# Patient Record
Sex: MALE | Race: WHITE | Employment: OTHER | URBAN - METROPOLITAN AREA
[De-identification: names, ages, dates, MRNs, and addresses within clinical notes are randomized per-mention and may not be internally consistent; named-entity substitution may affect disease eponyms.]

---

## 2017-01-20 RX ORDER — MELOXICAM 15 MG/1
TABLET ORAL
Qty: 30 TABLET | Refills: 3 | Status: SHIPPED | OUTPATIENT
Start: 2017-01-20 | End: 2017-05-15 | Stop reason: SDUPTHER

## 2017-05-15 RX ORDER — MELOXICAM 15 MG/1
TABLET ORAL
Qty: 30 TABLET | Refills: 6 | Status: SHIPPED | OUTPATIENT
Start: 2017-05-15 | End: 2017-12-24 | Stop reason: SDUPTHER

## 2017-06-16 RX ORDER — ROSUVASTATIN CALCIUM 40 MG/1
TABLET, COATED ORAL
Qty: 90 TABLET | Refills: 0 | Status: SHIPPED | OUTPATIENT
Start: 2017-06-16 | End: 2017-09-06 | Stop reason: SDUPTHER

## 2017-07-06 ENCOUNTER — TELEPHONE (OUTPATIENT)
Dept: CARDIOLOGY CLINIC | Age: 67
End: 2017-07-06

## 2017-07-06 ENCOUNTER — OFFICE VISIT (OUTPATIENT)
Dept: INTERNAL MEDICINE | Age: 67
End: 2017-07-06

## 2017-07-06 VITALS
SYSTOLIC BLOOD PRESSURE: 132 MMHG | HEART RATE: 60 BPM | BODY MASS INDEX: 35.12 KG/M2 | HEIGHT: 73 IN | DIASTOLIC BLOOD PRESSURE: 89 MMHG | RESPIRATION RATE: 12 BRPM | WEIGHT: 265 LBS

## 2017-07-06 DIAGNOSIS — Z01.818 PRE-OP EXAM: ICD-10-CM

## 2017-07-06 DIAGNOSIS — Z01.818 PRE-OP EXAM: Primary | ICD-10-CM

## 2017-07-06 DIAGNOSIS — Z12.5 SPECIAL SCREENING FOR MALIGNANT NEOPLASM OF PROSTATE: ICD-10-CM

## 2017-07-06 LAB
ANION GAP SERPL CALCULATED.3IONS-SCNC: 16 MMOL/L (ref 3–16)
BASOPHILS ABSOLUTE: 0 K/UL (ref 0–0.2)
BASOPHILS RELATIVE PERCENT: 0.5 %
BUN BLDV-MCNC: 24 MG/DL (ref 7–20)
CALCIUM SERPL-MCNC: 9.9 MG/DL (ref 8.3–10.6)
CHLORIDE BLD-SCNC: 101 MMOL/L (ref 99–110)
CO2: 27 MMOL/L (ref 21–32)
CREAT SERPL-MCNC: 0.8 MG/DL (ref 0.8–1.3)
EOSINOPHILS ABSOLUTE: 0.4 K/UL (ref 0–0.6)
EOSINOPHILS RELATIVE PERCENT: 4.4 %
GFR AFRICAN AMERICAN: >60
GFR NON-AFRICAN AMERICAN: >60
GLUCOSE BLD-MCNC: 89 MG/DL (ref 70–99)
HCT VFR BLD CALC: 51.2 % (ref 40.5–52.5)
HEMOGLOBIN: 16.8 G/DL (ref 13.5–17.5)
LYMPHOCYTES ABSOLUTE: 1.5 K/UL (ref 1–5.1)
LYMPHOCYTES RELATIVE PERCENT: 18.9 %
MCH RBC QN AUTO: 31.9 PG (ref 26–34)
MCHC RBC AUTO-ENTMCNC: 32.7 G/DL (ref 31–36)
MCV RBC AUTO: 97.4 FL (ref 80–100)
MONOCYTES ABSOLUTE: 0.6 K/UL (ref 0–1.3)
MONOCYTES RELATIVE PERCENT: 7.7 %
NEUTROPHILS ABSOLUTE: 5.5 K/UL (ref 1.7–7.7)
NEUTROPHILS RELATIVE PERCENT: 68.5 %
PDW BLD-RTO: 14.7 % (ref 12.4–15.4)
PLATELET # BLD: 214 K/UL (ref 135–450)
PMV BLD AUTO: 9.8 FL (ref 5–10.5)
POTASSIUM SERPL-SCNC: 4.7 MMOL/L (ref 3.5–5.1)
RBC # BLD: 5.25 M/UL (ref 4.2–5.9)
SODIUM BLD-SCNC: 144 MMOL/L (ref 136–145)
WBC # BLD: 8.1 K/UL (ref 4–11)

## 2017-07-06 PROCEDURE — G8427 DOCREV CUR MEDS BY ELIG CLIN: HCPCS | Performed by: INTERNAL MEDICINE

## 2017-07-06 PROCEDURE — 1036F TOBACCO NON-USER: CPT | Performed by: INTERNAL MEDICINE

## 2017-07-06 PROCEDURE — G8599 NO ASA/ANTIPLAT THER USE RNG: HCPCS | Performed by: INTERNAL MEDICINE

## 2017-07-06 PROCEDURE — 93000 ELECTROCARDIOGRAM COMPLETE: CPT | Performed by: INTERNAL MEDICINE

## 2017-07-06 PROCEDURE — 4040F PNEUMOC VAC/ADMIN/RCVD: CPT | Performed by: INTERNAL MEDICINE

## 2017-07-06 PROCEDURE — G8417 CALC BMI ABV UP PARAM F/U: HCPCS | Performed by: INTERNAL MEDICINE

## 2017-07-06 PROCEDURE — 99243 OFF/OP CNSLTJ NEW/EST LOW 30: CPT | Performed by: INTERNAL MEDICINE

## 2017-07-06 PROCEDURE — 3017F COLORECTAL CA SCREEN DOC REV: CPT | Performed by: INTERNAL MEDICINE

## 2017-08-14 ENCOUNTER — OFFICE VISIT (OUTPATIENT)
Dept: CARDIOLOGY CLINIC | Age: 67
End: 2017-08-14

## 2017-08-14 VITALS
BODY MASS INDEX: 34.83 KG/M2 | DIASTOLIC BLOOD PRESSURE: 70 MMHG | HEART RATE: 68 BPM | SYSTOLIC BLOOD PRESSURE: 124 MMHG | WEIGHT: 264 LBS

## 2017-08-14 DIAGNOSIS — I10 ESSENTIAL HYPERTENSION: ICD-10-CM

## 2017-08-14 DIAGNOSIS — E78.00 HYPERCHOLESTEROLEMIA: ICD-10-CM

## 2017-08-14 DIAGNOSIS — I25.119 CORONARY ARTERY DISEASE INVOLVING NATIVE CORONARY ARTERY OF NATIVE HEART WITH ANGINA PECTORIS (HCC): ICD-10-CM

## 2017-08-14 DIAGNOSIS — I25.119 CORONARY ARTERY DISEASE INVOLVING NATIVE CORONARY ARTERY OF NATIVE HEART WITH ANGINA PECTORIS (HCC): Primary | ICD-10-CM

## 2017-08-14 LAB
ALBUMIN SERPL-MCNC: 4.1 G/DL (ref 3.4–5)
ALP BLD-CCNC: 93 U/L (ref 40–129)
ALT SERPL-CCNC: 14 U/L (ref 10–40)
AST SERPL-CCNC: 16 U/L (ref 15–37)
BILIRUB SERPL-MCNC: 0.9 MG/DL (ref 0–1)
BILIRUBIN DIRECT: <0.2 MG/DL (ref 0–0.3)
BILIRUBIN, INDIRECT: ABNORMAL MG/DL (ref 0–1)
CHOLESTEROL, TOTAL: 142 MG/DL (ref 0–199)
HDLC SERPL-MCNC: 48 MG/DL (ref 40–60)
LDL CHOLESTEROL CALCULATED: 73 MG/DL
TOTAL CK: 58 U/L (ref 39–308)
TOTAL PROTEIN: 6.3 G/DL (ref 6.4–8.2)
TRIGL SERPL-MCNC: 103 MG/DL (ref 0–150)
VLDLC SERPL CALC-MCNC: 21 MG/DL

## 2017-08-14 PROCEDURE — 1123F ACP DISCUSS/DSCN MKR DOCD: CPT | Performed by: INTERNAL MEDICINE

## 2017-08-14 PROCEDURE — 3017F COLORECTAL CA SCREEN DOC REV: CPT | Performed by: INTERNAL MEDICINE

## 2017-08-14 PROCEDURE — 99214 OFFICE O/P EST MOD 30 MIN: CPT | Performed by: INTERNAL MEDICINE

## 2017-08-14 PROCEDURE — G8427 DOCREV CUR MEDS BY ELIG CLIN: HCPCS | Performed by: INTERNAL MEDICINE

## 2017-08-14 PROCEDURE — 1036F TOBACCO NON-USER: CPT | Performed by: INTERNAL MEDICINE

## 2017-08-14 PROCEDURE — G8599 NO ASA/ANTIPLAT THER USE RNG: HCPCS | Performed by: INTERNAL MEDICINE

## 2017-08-14 PROCEDURE — 4040F PNEUMOC VAC/ADMIN/RCVD: CPT | Performed by: INTERNAL MEDICINE

## 2017-08-14 PROCEDURE — G8417 CALC BMI ABV UP PARAM F/U: HCPCS | Performed by: INTERNAL MEDICINE

## 2017-08-14 RX ORDER — OXYCODONE HYDROCHLORIDE AND ACETAMINOPHEN 5; 325 MG/1; MG/1
TABLET ORAL
Refills: 0 | COMMUNITY
Start: 2017-07-12 | End: 2018-06-26 | Stop reason: ALTCHOICE

## 2017-09-07 RX ORDER — ROSUVASTATIN CALCIUM 40 MG/1
TABLET, COATED ORAL
Qty: 90 TABLET | Refills: 0 | Status: SHIPPED | OUTPATIENT
Start: 2017-09-07 | End: 2018-03-20 | Stop reason: SDUPTHER

## 2017-12-05 RX ORDER — CARVEDILOL 6.25 MG/1
TABLET ORAL
Qty: 180 TABLET | Refills: 3 | Status: SHIPPED | OUTPATIENT
Start: 2017-12-05 | End: 2018-09-18 | Stop reason: SDUPTHER

## 2017-12-13 RX ORDER — MELOXICAM 15 MG/1
TABLET ORAL
Qty: 30 TABLET | Refills: 0 | OUTPATIENT
Start: 2017-12-13

## 2017-12-22 RX ORDER — RAMIPRIL 10 MG/1
CAPSULE ORAL
Qty: 90 CAPSULE | Refills: 3 | Status: SHIPPED | OUTPATIENT
Start: 2017-12-22 | End: 2019-01-18 | Stop reason: SDUPTHER

## 2017-12-22 NOTE — TELEPHONE ENCOUNTER
Requested Prescriptions     Pending Prescriptions Disp Refills    ramipril (ALTACE) 10 MG capsule [Pharmacy Med Name: RAMIPRIL 10MG CAPSULES] 90 capsule 3     Sig: TAKE ONE CAPSULE BY MOUTH EVERY DAY   Last Fill         Last seen        Next appointment      8/14/17 8/14/17 8/13/18        Last labs     Ordering Physician     8/14/17 Nolan Parkinson

## 2017-12-26 RX ORDER — MELOXICAM 15 MG/1
TABLET ORAL
Qty: 30 TABLET | Refills: 5 | Status: ON HOLD | OUTPATIENT
Start: 2017-12-26 | End: 2018-06-27 | Stop reason: HOSPADM

## 2018-03-20 ENCOUNTER — TELEPHONE (OUTPATIENT)
Dept: CARDIOLOGY CLINIC | Age: 68
End: 2018-03-20

## 2018-03-20 RX ORDER — ROSUVASTATIN CALCIUM 40 MG/1
TABLET, COATED ORAL
Qty: 90 TABLET | Refills: 1 | Status: SHIPPED | OUTPATIENT
Start: 2018-03-20 | End: 2018-09-13 | Stop reason: SDUPTHER

## 2018-06-26 PROBLEM — R07.9 CHEST PAIN: Status: ACTIVE | Noted: 2018-06-26

## 2018-06-26 PROBLEM — R07.89 ATYPICAL CHEST PAIN: Status: ACTIVE | Noted: 2018-06-26

## 2018-09-06 ENCOUNTER — OFFICE VISIT (OUTPATIENT)
Dept: CARDIOLOGY CLINIC | Age: 68
End: 2018-09-06

## 2018-09-06 VITALS
SYSTOLIC BLOOD PRESSURE: 130 MMHG | HEART RATE: 54 BPM | DIASTOLIC BLOOD PRESSURE: 80 MMHG | BODY MASS INDEX: 35.73 KG/M2 | WEIGHT: 269.6 LBS | HEIGHT: 73 IN | OXYGEN SATURATION: 99 %

## 2018-09-06 DIAGNOSIS — I50.22 CHRONIC SYSTOLIC HEART FAILURE (HCC): Primary | ICD-10-CM

## 2018-09-06 DIAGNOSIS — I10 ESSENTIAL HYPERTENSION: ICD-10-CM

## 2018-09-06 DIAGNOSIS — I25.5 ISCHEMIC CARDIOMYOPATHY: ICD-10-CM

## 2018-09-06 DIAGNOSIS — E78.2 MIXED HYPERLIPIDEMIA: ICD-10-CM

## 2018-09-06 PROCEDURE — G8598 ASA/ANTIPLAT THER USED: HCPCS | Performed by: INTERNAL MEDICINE

## 2018-09-06 PROCEDURE — G8427 DOCREV CUR MEDS BY ELIG CLIN: HCPCS | Performed by: INTERNAL MEDICINE

## 2018-09-06 PROCEDURE — 1036F TOBACCO NON-USER: CPT | Performed by: INTERNAL MEDICINE

## 2018-09-06 PROCEDURE — 4040F PNEUMOC VAC/ADMIN/RCVD: CPT | Performed by: INTERNAL MEDICINE

## 2018-09-06 PROCEDURE — G8417 CALC BMI ABV UP PARAM F/U: HCPCS | Performed by: INTERNAL MEDICINE

## 2018-09-06 PROCEDURE — 99214 OFFICE O/P EST MOD 30 MIN: CPT | Performed by: INTERNAL MEDICINE

## 2018-09-06 PROCEDURE — 1101F PT FALLS ASSESS-DOCD LE1/YR: CPT | Performed by: INTERNAL MEDICINE

## 2018-09-06 PROCEDURE — 1123F ACP DISCUSS/DSCN MKR DOCD: CPT | Performed by: INTERNAL MEDICINE

## 2018-09-06 PROCEDURE — 3017F COLORECTAL CA SCREEN DOC REV: CPT | Performed by: INTERNAL MEDICINE

## 2018-09-06 NOTE — PROGRESS NOTES
[address]      Cardiology Consult    Unger Day  1950    September 6, 2018    Primary care physician: Chepe Emmanuel MD  Reason for Referral: Ischemic cardiomyopathy    CC: \"No chest pains\"     HPI:  The patient is 77 y.o. male with a past medical history significant for ischemic cardiomyopathy, CAD s/p PCI in 2000, HTN, MIC, and HLD who presents today for management of cardiomyopathy. He presented to the hospital 6/2018 with complaints of atypical chest pain and ruled out for ACS. He previously followed with Dr. Bebe Rob. He had an acute proximal LAD MI in 2000 with a resulting ischemic cardiomyopathy. In general, he felt he was doing quite well and had no recurrent cardiac issues. Today, he denies any recurrence of chest pains. He reports compliance with his medications and is tolerating. Patient denies dyspnea at rest, worsening AVERY, PND, orthopnea, palpitations, lightheadedness, weight changes, changes in LE edema, and syncope. In general, he feels most limited by orthopaedic issues with right sided hip and knee pains. He denies having to stop any activity secondary to dyspnea. Past Medical History:   Diagnosis Date    Acute MI ([facility] [address] 2000    coronary angiogram revealed a normal left main, normal circumflex, the LAD complete occlusion proximally with a mid 65% stenosis that was reduced by PTCA/stent to 0%, 20% distal right coronary with an LVEF of 35%.      Basal cell carcinoma     chin    CAD (coronary artery disease)     Cardiomyopathy ([facility] [address]     Hypercholesterolemia     Hypertension     Obstructive sleep apnea 2014    Dr. Saunders [facility]     Past Surgical History:   Procedure Laterality Date    CARDIAC CATHETERIZATION      PTCA/stent in the mid LAD reducing a 65% stenosis to 0%    CORONARY ANGIOPLASTY      stent    KNEE ARTHROSCOPY  *July 12, 2017    Dr. Norma Barlow - right torn meniscus    SKIN CANCER EXCISION      chin     Family History   Problem Relation Age of Onset  Cancer Mother [de-identified]         - breast cancer    Heart Disease Father 79         - ASHD     Social History   Substance Use Topics    Smoking status: Never Smoker    Smokeless tobacco: Never Used    Alcohol use Yes      Comment: 2 kristal per night       No Known Allergies  Current Outpatient Prescriptions   Medication Sig Dispense Refill    pantoprazole (PROTONIX) 40 MG tablet Take 1 tablet by mouth every morning (before breakfast) 30 tablet 3    spironolactone (ALDACTONE) 25 MG tablet Take 1 tablet by mouth daily 30 tablet 3    aspirin 81 MG tablet Take 162 mg by mouth nightly      rosuvastatin (CRESTOR) 40 MG tablet TAKE 1 TABLET BY MOUTH EVERY DAY 90 tablet 1    ramipril (ALTACE) 10 MG capsule TAKE ONE CAPSULE BY MOUTH EVERY DAY 90 capsule 3    carvedilol (COREG) 6.25 MG tablet TAKE 1 TABLET BY MOUTH TWICE DAILY WITH MEALS 180 tablet 3     No current facility-administered medications for this visit. Review of Systems:  · Constitutional: no unanticipated weight loss. There's been no change in energy level, sleep pattern, or activity level. No fevers, chills. · Eyes: No visual changes or diplopia. No scleral icterus. · ENT: No Headaches, hearing loss or vertigo. No mouth sores or sore throat. · Cardiovascular: as reviewed in HPI  · Respiratory: No cough or wheezing, no sputum production. No hematemesis. · Gastrointestinal: No abdominal pain, appetite loss, blood in stools. No change in bowel or bladder habits. · Genitourinary: No dysuria, trouble voiding, or hematuria. · Musculoskeletal:  No gait disturbance, no joint complaints. · Integumentary: No rash or pruritis. · Neurological: No headache, diplopia, change in muscle strength, numbness or tingling. · Psychiatric: No anxiety or depression. · Endocrine: No temperature intolerance. No excessive thirst, fluid intake, or urination. No tremor.   · Hematologic/Lymphatic: No abnormal bruising or bleeding, blood clots or swollen lymph nodes.  · Allergic/Immunologic: No nasal congestion or hives. Physical Exam:   /80 (Site: Left Arm, Position: Sitting, Cuff Size: Medium Adult)   Pulse 54   Ht 6' 1\" (1.854 m)   Wt 269 lb 9.6 oz (122.3 kg)   SpO2 99%   BMI 35.57 kg/m²   Wt Readings from Last 3 Encounters:   18 269 lb 9.6 oz (122.3 kg)   18 266 lb (120.7 kg)   18 267 lb 10.2 oz (121.4 kg)     Constitutional: He is oriented to person, place, and time. He appears well-developed and well-nourished. In no acute distress. Head: Normocephalic and atraumatic. Pupils equal and round. Neck: Neck supple. No JVP or carotid bruit appreciated. No mass and no thyromegaly present. No lymphadenopathy present. Cardiovascular: Normal rate. Normal heart sounds. Exam reveals no gallop and no friction rub. No murmur heard. Pulmonary/Chest: Effort normal and breath sounds normal. No respiratory distress. He has no wheezes, rhonchi or rales. Abdominal: Soft, non-tender. Bowel sounds are normal. He exhibits no organomegaly, mass or bruit. Extremities: No edema, cyanosis, or clubbing. Pulses are 2+ radial/carotid bilaterally. Neurological: No gross cranial nerve deficit. Coordination normal.   Skin: Skin is warm and dry. There is no rash or diaphoresis. Psychiatric: He has a normal mood and affect. His speech is normal and behavior is normal.     Lab Review:   FLP:    Lab Results   Component Value Date    TRIG 85 2018    HDL 51 2018    LDLCALC 57 2018    LABVLDL 17 2018     BUN/Creatinine:    Lab Results   Component Value Date    BUN 18 2018    CREATININE 0.8 2018     EK18 NSR. Left axis. LBBB    Echo: 18  Suboptimal image quality.   Definity contrast administered.   Ejection fraction appears significantly decreased, with apical,akinesis &   severe anterior hypokinesis. Estimated LVEF is 59-78%   Diastolic filling parameters suggest grade I diastolic dysfunction.   Mitral valve leaflets appear mildly thickened.   Trivial mitral regurgitation is present.   Trivial aortic regurgitation is present.  IVC size is dilated (>2.1 cm) and collapses < 50% with respiration   consistent with elevated RA pressure (15 mmHg).    Cath: 2000 Proximal LAD occlusion.      Lexiscan: 6/26/18   Abnormal study. There is a large sized, severe intensity, fixed defect of    the mid anterior, mid anteroseptal, mid inferoseptal, all apical segments    and the apical cap which is consistent with scar/prior infarction.    No evidence of significant reversible ischemia.    Severely dilated left ventricle with moderately reduced ejection fraction    calculated at 37 %.    Overall findings represent a high risk study based on the sized of the fixed    defect consistent with prior LAD infarct.         Assessment and Plan   1) Ischemic cardiomyopathy/chronic systolic heart failure. EF 30-35%. Appears euvolemic/compensated. NYHA II. Continue with medical management and risk factor modification including aspirin, ACE-I, statin, and B-blocker. Referred to EP for ICD or BiV-ICD consideration. Discussed option of Entresto but he is most comfortable continuing current management.    2) Essential hypertension. Controlled. Goal BP <130/80. Continue medical therapy.    3) Hyperlipidemia. Continue high intensity statin.       Follow up in 6 months and with Dr. Shannan Rowley with scheduled appointment 9/18/18. Thank you very much for allowing me to participate in the care of your patient. Please do not hesitate to contact me if you have any questions. Sincerely,  Nuvia Melendez. Jimi Nascimento, [address] [place] [address]  Ph: [phone]  Fax: [phone]      This note was scribed in the presence of Dr Jimi Nascimento MD by Preston Denson, ALAN. Physician Attestation: The scribes documentation has been prepared under my direction and personally reviewed by me it its entirety. I confirm that the note above accurately reflects all work, treatment, procedures, and medical decision making performed by me.

## 2018-09-14 RX ORDER — ROSUVASTATIN CALCIUM 40 MG/1
TABLET, COATED ORAL
Qty: 90 TABLET | Refills: 3 | Status: SHIPPED | OUTPATIENT
Start: 2018-09-14 | End: 2018-11-09 | Stop reason: SDUPTHER

## 2018-09-14 RX ORDER — ROSUVASTATIN CALCIUM 40 MG/1
TABLET, COATED ORAL
Qty: 90 TABLET | Refills: 0 | Status: SHIPPED | OUTPATIENT
Start: 2018-09-14 | End: 2019-10-09 | Stop reason: SDUPTHER

## 2018-09-17 NOTE — PROGRESS NOTES
[facility]   Cardiac Consultation    Referring Provider:  Isma Ross MD     Chief Complaint   Patient presents with    Cardiomyopathy     Ref. Dr. Fab Andres possible Bi-V ICD. CHF,MIC,CAD. EF 30-35%. Patient denies sx's. HPI:  Ernesto Victoria is a 77 y.o. male with a past medical history significant for ischemic cardiomyopathy, CAD s/p PCI in 2000, HTN, MIC compliant with CPAP, and HLD. The patient is typically followed by Dr. Fab Andres (previously Dr. Renae Conn) for his cardiac needs but arrives today to consider B- V ICD implant for primary prevention of SCD. He remains as active as possible-- his limitations are secondary to knee, hip and back pain. Up until 2 years ago he was walking 4-5 miles daily (completed in 1 hour) without cardiac symptoms. 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 He has been on the appropriate medication regimen for many years it has not been changed or titrated up in a long time. He denies lightheadedness, dizziness, chest pain, palpitations, orthopnea, edema, presyncope or syncope. Treatment of MIC he indicates improved his LV fx significantly. Past Medical History:   has a past medical history of Acute MI ([facility] [address] Basal cell carcinoma; CAD (coronary artery disease); Cardiomyopathy ([facility] [address] Hypercholesterolemia; Hypertension; and Obstructive sleep apnea. Surgical History:   has a past surgical history that includes Cardiac catheterization; Skin cancer excision; Knee arthroscopy (*July 12, 2017); and Coronary angioplasty. Social History:   reports that he has never smoked. He has never used smokeless tobacco. He reports that he drinks alcohol. He reports that he does not use drugs. Family History:  family history includes Cancer (age of onset: [de-identified]) in his mother; Heart Disease (age of onset: 79) in his father.      Home Medications:  Outpatient Encounter Prescriptions as of 9/18/2018   Medication Sig Dispense Refill    carvedilol (COREG) 12.5 MG medication titration followed by a repeat ECHO prior to deciding on the Bi-V ICD implant since he falls on the cusp of guidelines      Plan:  -Medication titration and repeat ECHO to Dr. Justo Harrison  -Pending the outcome will place Bi-V ICD   -Follow up pending ef reassessment    This note was scribed in the presence of Dr. Lexi De La Garza MD by Yamilka Hollins RN. The scribes documentation has been prepared under my direction and personally reviewed by me in its entirety. I confirm that the note above accurately reflects all work, treatment, procedures, and medical decision making performed by me. Tye Smith.  Liban GRAHAM

## 2018-09-18 ENCOUNTER — TELEPHONE (OUTPATIENT)
Dept: CARDIOLOGY CLINIC | Age: 68
End: 2018-09-18

## 2018-09-18 ENCOUNTER — OFFICE VISIT (OUTPATIENT)
Dept: CARDIOLOGY CLINIC | Age: 68
End: 2018-09-18

## 2018-09-18 VITALS
OXYGEN SATURATION: 98 % | WEIGHT: 271 LBS | BODY MASS INDEX: 35.92 KG/M2 | DIASTOLIC BLOOD PRESSURE: 70 MMHG | SYSTOLIC BLOOD PRESSURE: 116 MMHG | HEART RATE: 60 BPM | HEIGHT: 73 IN

## 2018-09-18 DIAGNOSIS — Z71.89 ENCOUNTER FOR DISCUSSION REGARDING IMPLANTABLE CARDIOVERTER-DEFIBRILLATOR (ICD): ICD-10-CM

## 2018-09-18 DIAGNOSIS — I25.5 ISCHEMIC CARDIOMYOPATHY: Primary | ICD-10-CM

## 2018-09-18 PROCEDURE — 93000 ELECTROCARDIOGRAM COMPLETE: CPT | Performed by: INTERNAL MEDICINE

## 2018-09-18 PROCEDURE — 1036F TOBACCO NON-USER: CPT | Performed by: INTERNAL MEDICINE

## 2018-09-18 PROCEDURE — 4040F PNEUMOC VAC/ADMIN/RCVD: CPT | Performed by: INTERNAL MEDICINE

## 2018-09-18 PROCEDURE — 1101F PT FALLS ASSESS-DOCD LE1/YR: CPT | Performed by: INTERNAL MEDICINE

## 2018-09-18 PROCEDURE — 99204 OFFICE O/P NEW MOD 45 MIN: CPT | Performed by: INTERNAL MEDICINE

## 2018-09-18 PROCEDURE — 3017F COLORECTAL CA SCREEN DOC REV: CPT | Performed by: INTERNAL MEDICINE

## 2018-09-18 PROCEDURE — G8427 DOCREV CUR MEDS BY ELIG CLIN: HCPCS | Performed by: INTERNAL MEDICINE

## 2018-09-18 PROCEDURE — G8417 CALC BMI ABV UP PARAM F/U: HCPCS | Performed by: INTERNAL MEDICINE

## 2018-09-18 PROCEDURE — 1123F ACP DISCUSS/DSCN MKR DOCD: CPT | Performed by: INTERNAL MEDICINE

## 2018-09-18 PROCEDURE — G8598 ASA/ANTIPLAT THER USED: HCPCS | Performed by: INTERNAL MEDICINE

## 2018-09-18 RX ORDER — CARVEDILOL 12.5 MG/1
12.5 TABLET ORAL 2 TIMES DAILY
Qty: 60 TABLET | Refills: 6 | Status: SHIPPED | OUTPATIENT
Start: 2018-09-18 | End: 2018-11-09 | Stop reason: SDUPTHER

## 2018-09-18 NOTE — TELEPHONE ENCOUNTER
The patient was seen in office today with Dr. Eloy Watson. He was referred by Dr. Concepción Cam for possible Bi-V ICD. He is borderline criteria to meet guidelines. Pt and Dr. Eloy Watson came to the agreement that they would like to try optimizing his medications further before pursuing implant. In office, Dr. Eloy Watson increased his beta blocker and would like Dr. Concepción Cam to continue to follow/ manage. Once they have been optimized to the fullest and a repeat ECHO has been obtained, please refer him back to Dr. Eloy Watson. Thanks!

## 2018-11-08 ENCOUNTER — OFFICE VISIT (OUTPATIENT)
Dept: INTERNAL MEDICINE CLINIC | Age: 68
End: 2018-11-08
Payer: COMMERCIAL

## 2018-11-08 VITALS
RESPIRATION RATE: 12 BRPM | DIASTOLIC BLOOD PRESSURE: 66 MMHG | BODY MASS INDEX: 36.18 KG/M2 | HEART RATE: 70 BPM | HEIGHT: 73 IN | SYSTOLIC BLOOD PRESSURE: 118 MMHG | WEIGHT: 273 LBS

## 2018-11-08 DIAGNOSIS — Z13.29 SCREENING FOR THYROID DISORDER: ICD-10-CM

## 2018-11-08 DIAGNOSIS — Z01.818 PRE-OP EXAM: Primary | ICD-10-CM

## 2018-11-08 DIAGNOSIS — M16.9 OSTEOARTHRITIS OF HIP, UNSPECIFIED LATERALITY, UNSPECIFIED OSTEOARTHRITIS TYPE: ICD-10-CM

## 2018-11-08 DIAGNOSIS — Z12.5 SPECIAL SCREENING FOR MALIGNANT NEOPLASM OF PROSTATE: ICD-10-CM

## 2018-11-08 DIAGNOSIS — Z12.11 SCREENING FOR COLON CANCER: ICD-10-CM

## 2018-11-08 LAB
PROSTATE SPECIFIC ANTIGEN: 1.47 NG/ML (ref 0–4)
TSH SERPL DL<=0.05 MIU/L-ACNC: 0.91 UIU/ML (ref 0.27–4.2)

## 2018-11-08 PROCEDURE — G8427 DOCREV CUR MEDS BY ELIG CLIN: HCPCS | Performed by: INTERNAL MEDICINE

## 2018-11-08 PROCEDURE — G8417 CALC BMI ABV UP PARAM F/U: HCPCS | Performed by: INTERNAL MEDICINE

## 2018-11-08 PROCEDURE — 1101F PT FALLS ASSESS-DOCD LE1/YR: CPT | Performed by: INTERNAL MEDICINE

## 2018-11-08 PROCEDURE — 3017F COLORECTAL CA SCREEN DOC REV: CPT | Performed by: INTERNAL MEDICINE

## 2018-11-08 PROCEDURE — 99243 OFF/OP CNSLTJ NEW/EST LOW 30: CPT | Performed by: INTERNAL MEDICINE

## 2018-11-08 PROCEDURE — G8482 FLU IMMUNIZE ORDER/ADMIN: HCPCS | Performed by: INTERNAL MEDICINE

## 2018-11-08 ASSESSMENT — PATIENT HEALTH QUESTIONNAIRE - PHQ9
SUM OF ALL RESPONSES TO PHQ QUESTIONS 1-9: 0
SUM OF ALL RESPONSES TO PHQ9 QUESTIONS 1 & 2: 0
1. LITTLE INTEREST OR PLEASURE IN DOING THINGS: 0
2. FEELING DOWN, DEPRESSED OR HOPELESS: 0
SUM OF ALL RESPONSES TO PHQ QUESTIONS 1-9: 0

## 2018-11-08 NOTE — PROGRESS NOTES
normal, no murmur, click, rub or gallop  Abdomen: soft, non-tender; bowel sounds normal; no masses,  no organomegaly  Extremities: extremities normal, atraumatic, no cyanosis or edema  Neurological: Gait normal. Reflexes normal and symmetric.  Sensation grossly normal  Pulse: radial=4/4, femoral=4/4, popliteal=4/4, dorsalis pedis=4/4,   Skin: normal coloration and turgor, no rashes, no suspicious skin lesions noted  Psych: normal    Lab Review:   Admission on 06/26/2018, Discharged on 06/27/2018   Component Date Value    Ventricular Rate 06/26/2018 69     Atrial Rate 06/26/2018 69     P-R Interval 06/26/2018 178     QRS Duration 06/26/2018 140     Q-T Interval 06/26/2018 402     QTc Calculation (Bazett) 06/26/2018 430     P Axis 06/26/2018 23     R Axis 06/26/2018 -55     T Axis 06/26/2018 102     Diagnosis 06/26/2018                      Value:Normal sinus rhythm  Left axis deviation  Left bundle branch block  Abnormal ECG  No previous ECGs available  Confirmed by REBECA LOGAN MD ([id number]) on 6/26/2018 9:29:49 AM      WBC 06/26/2018 8.2     RBC 06/26/2018 4.67     Hemoglobin 06/26/2018 14.6     Hematocrit 06/26/2018 44.8     MCV 06/26/2018 95.8     MCH 06/26/2018 31.3     MCHC 06/26/2018 32.7     RDW 06/26/2018 13.1     Platelets 51/75/0870 175     MPV 06/26/2018 9.4     Sodium 06/26/2018 142     Potassium 06/26/2018 4.0     Chloride 06/26/2018 103     CO2 06/26/2018 29     Anion Gap 06/26/2018 10     Glucose 06/26/2018 149*    BUN 06/26/2018 16     CREATININE 06/26/2018 0.6*    GFR Non- 06/26/2018 >60     GFR  06/26/2018 >60     Calcium 06/26/2018 9.5     Troponin 06/26/2018 <0.01     Troponin 06/26/2018 <0.01     Pro-BNP 06/26/2018 540*    Hemoglobin A1C 06/26/2018 5.9     eAG 06/26/2018 122.6     Cholesterol, Total 06/27/2018 125     Triglycerides 06/27/2018 85     HDL 06/27/2018 51     LDL Calculated 06/27/2018 57     VLDL Cholesterol

## 2018-11-09 ENCOUNTER — OFFICE VISIT (OUTPATIENT)
Dept: CARDIOLOGY CLINIC | Age: 68
End: 2018-11-09
Payer: COMMERCIAL

## 2018-11-09 VITALS
WEIGHT: 272 LBS | DIASTOLIC BLOOD PRESSURE: 70 MMHG | SYSTOLIC BLOOD PRESSURE: 136 MMHG | HEART RATE: 58 BPM | HEIGHT: 73 IN | BODY MASS INDEX: 36.05 KG/M2 | OXYGEN SATURATION: 96 %

## 2018-11-09 DIAGNOSIS — Z01.818 PRE-OPERATIVE CLEARANCE: ICD-10-CM

## 2018-11-09 DIAGNOSIS — I25.5 ISCHEMIC CARDIOMYOPATHY: Primary | ICD-10-CM

## 2018-11-09 DIAGNOSIS — I10 ESSENTIAL HYPERTENSION: ICD-10-CM

## 2018-11-09 DIAGNOSIS — I50.22 CHRONIC SYSTOLIC CONGESTIVE HEART FAILURE (HCC): ICD-10-CM

## 2018-11-09 DIAGNOSIS — E78.2 MIXED HYPERLIPIDEMIA: ICD-10-CM

## 2018-11-09 PROCEDURE — G8417 CALC BMI ABV UP PARAM F/U: HCPCS | Performed by: INTERNAL MEDICINE

## 2018-11-09 PROCEDURE — 3017F COLORECTAL CA SCREEN DOC REV: CPT | Performed by: INTERNAL MEDICINE

## 2018-11-09 PROCEDURE — 1101F PT FALLS ASSESS-DOCD LE1/YR: CPT | Performed by: INTERNAL MEDICINE

## 2018-11-09 PROCEDURE — G8482 FLU IMMUNIZE ORDER/ADMIN: HCPCS | Performed by: INTERNAL MEDICINE

## 2018-11-09 PROCEDURE — 1036F TOBACCO NON-USER: CPT | Performed by: INTERNAL MEDICINE

## 2018-11-09 PROCEDURE — 4040F PNEUMOC VAC/ADMIN/RCVD: CPT | Performed by: INTERNAL MEDICINE

## 2018-11-09 PROCEDURE — 1123F ACP DISCUSS/DSCN MKR DOCD: CPT | Performed by: INTERNAL MEDICINE

## 2018-11-09 PROCEDURE — G8598 ASA/ANTIPLAT THER USED: HCPCS | Performed by: INTERNAL MEDICINE

## 2018-11-09 PROCEDURE — 99214 OFFICE O/P EST MOD 30 MIN: CPT | Performed by: INTERNAL MEDICINE

## 2018-11-09 PROCEDURE — G8427 DOCREV CUR MEDS BY ELIG CLIN: HCPCS | Performed by: INTERNAL MEDICINE

## 2018-11-09 RX ORDER — PANTOPRAZOLE SODIUM 40 MG/1
40 TABLET, DELAYED RELEASE ORAL
Qty: 30 TABLET | Refills: 3 | Status: SHIPPED | OUTPATIENT
Start: 2018-11-09 | End: 2019-10-29

## 2018-11-09 RX ORDER — CARVEDILOL 25 MG/1
25 TABLET ORAL 2 TIMES DAILY
Qty: 60 TABLET | Refills: 5 | Status: SHIPPED | OUTPATIENT
Start: 2018-11-09 | End: 2019-06-20 | Stop reason: SDUPTHER

## 2018-11-09 RX ORDER — SPIRONOLACTONE 25 MG/1
25 TABLET ORAL DAILY
Qty: 30 TABLET | Refills: 3 | Status: SHIPPED | OUTPATIENT
Start: 2018-11-09 | End: 2019-02-22 | Stop reason: SDUPTHER

## 2018-11-09 NOTE — PATIENT INSTRUCTIONS
you how much sodium you get in one serving. Check the serving size. If you eat more than one serving, you are getting more sodium. · Be aware that sodium can come in forms other than salt, including monosodium glutamate (MSG), sodium citrate, and sodium bicarbonate (baking soda). MSG is often added to Asian food. You can sometimes ask for food without MSG or salt. · Slowly reducing salt will help you adjust to the taste. Take the salt shaker off the table. · Flavor your food with garlic, lemon juice, onion, vinegar, herbs, and spices instead of salt. Do not use soy sauce, steak sauce, onion salt, garlic salt, mustard, or ketchup on your food, unless it is labeled \"low-sodium\" or \"low-salt. \"  · Make your own salad dressings, sauces, and ketchup without adding salt. · Use fresh or frozen ingredients, instead of canned ones, whenever you can. Choose low-sodium canned goods. · Eat less processed food and food from restaurants, including fast food. Exercise as directed  Moderate, regular exercise is very good for your heart. It improves your blood flow and helps control your weight. But too much exercise can stress your heart and cause a heart failure flare-up. · Check with your doctor before you start an exercise program.  · Walking is an easy way to get exercise. Start out slowly. Gradually increase the length and pace of your walk. Swimming, riding a bike, and using a treadmill are also good forms of exercise. · When you exercise, watch for signs that your heart is working too hard. You are pushing yourself too hard if you cannot talk while you are exercising. If you become short of breath or dizzy or have chest pain, stop, sit down, and rest.  · Do not exercise when you do not feel well. Take medicines correctly  · Take your medicines exactly as prescribed. Call your doctor if you think you are having a problem with your medicine. · Make a list of all the medicines you take.  Include those prescribed to you

## 2019-01-18 RX ORDER — RAMIPRIL 10 MG/1
CAPSULE ORAL
Qty: 90 CAPSULE | Refills: 0 | Status: SHIPPED | OUTPATIENT
Start: 2019-01-18 | End: 2019-04-15 | Stop reason: SDUPTHER

## 2019-02-12 ENCOUNTER — OFFICE VISIT (OUTPATIENT)
Dept: CARDIOLOGY CLINIC | Age: 69
End: 2019-02-12
Payer: COMMERCIAL

## 2019-02-12 VITALS
WEIGHT: 267 LBS | BODY MASS INDEX: 35.39 KG/M2 | HEART RATE: 53 BPM | SYSTOLIC BLOOD PRESSURE: 112 MMHG | OXYGEN SATURATION: 99 % | HEIGHT: 73 IN | DIASTOLIC BLOOD PRESSURE: 62 MMHG

## 2019-02-12 DIAGNOSIS — I10 ESSENTIAL HYPERTENSION: ICD-10-CM

## 2019-02-12 DIAGNOSIS — I50.22 CHRONIC SYSTOLIC CONGESTIVE HEART FAILURE (HCC): ICD-10-CM

## 2019-02-12 DIAGNOSIS — E78.2 MIXED HYPERLIPIDEMIA: ICD-10-CM

## 2019-02-12 DIAGNOSIS — I25.5 ISCHEMIC CARDIOMYOPATHY: Primary | ICD-10-CM

## 2019-02-12 PROCEDURE — 1036F TOBACCO NON-USER: CPT | Performed by: INTERNAL MEDICINE

## 2019-02-12 PROCEDURE — 4040F PNEUMOC VAC/ADMIN/RCVD: CPT | Performed by: INTERNAL MEDICINE

## 2019-02-12 PROCEDURE — G8417 CALC BMI ABV UP PARAM F/U: HCPCS | Performed by: INTERNAL MEDICINE

## 2019-02-12 PROCEDURE — G8482 FLU IMMUNIZE ORDER/ADMIN: HCPCS | Performed by: INTERNAL MEDICINE

## 2019-02-12 PROCEDURE — 99214 OFFICE O/P EST MOD 30 MIN: CPT | Performed by: INTERNAL MEDICINE

## 2019-02-12 PROCEDURE — 93000 ELECTROCARDIOGRAM COMPLETE: CPT | Performed by: INTERNAL MEDICINE

## 2019-02-12 PROCEDURE — 1123F ACP DISCUSS/DSCN MKR DOCD: CPT | Performed by: INTERNAL MEDICINE

## 2019-02-12 PROCEDURE — G8598 ASA/ANTIPLAT THER USED: HCPCS | Performed by: INTERNAL MEDICINE

## 2019-02-12 PROCEDURE — 1101F PT FALLS ASSESS-DOCD LE1/YR: CPT | Performed by: INTERNAL MEDICINE

## 2019-02-12 PROCEDURE — 3017F COLORECTAL CA SCREEN DOC REV: CPT | Performed by: INTERNAL MEDICINE

## 2019-02-12 PROCEDURE — G8427 DOCREV CUR MEDS BY ELIG CLIN: HCPCS | Performed by: INTERNAL MEDICINE

## 2019-02-22 RX ORDER — SPIRONOLACTONE 25 MG/1
25 TABLET ORAL DAILY
Qty: 30 TABLET | Refills: 5 | Status: SHIPPED | OUTPATIENT
Start: 2019-02-22 | End: 2019-07-24 | Stop reason: SDUPTHER

## 2019-04-15 RX ORDER — RAMIPRIL 10 MG/1
CAPSULE ORAL
Qty: 90 CAPSULE | Refills: 0 | Status: SHIPPED | OUTPATIENT
Start: 2019-04-15 | End: 2019-07-07 | Stop reason: SDUPTHER

## 2019-06-20 DIAGNOSIS — I25.5 ISCHEMIC CARDIOMYOPATHY: ICD-10-CM

## 2019-06-20 RX ORDER — CARVEDILOL 25 MG/1
TABLET ORAL
Qty: 60 TABLET | Refills: 2 | Status: SHIPPED | OUTPATIENT
Start: 2019-06-20 | End: 2019-09-09 | Stop reason: SDUPTHER

## 2019-07-08 RX ORDER — RAMIPRIL 10 MG/1
CAPSULE ORAL
Qty: 90 CAPSULE | Refills: 1 | Status: SHIPPED | OUTPATIENT
Start: 2019-07-08 | End: 2020-01-03

## 2019-07-26 RX ORDER — SPIRONOLACTONE 25 MG/1
25 TABLET ORAL DAILY
Qty: 30 TABLET | Refills: 0 | Status: SHIPPED | OUTPATIENT
Start: 2019-07-26 | End: 2019-10-09 | Stop reason: SDUPTHER

## 2019-09-09 DIAGNOSIS — I25.5 ISCHEMIC CARDIOMYOPATHY: ICD-10-CM

## 2019-09-10 RX ORDER — CARVEDILOL 25 MG/1
TABLET ORAL
Qty: 180 TABLET | Refills: 1 | Status: SHIPPED | OUTPATIENT
Start: 2019-09-10 | End: 2020-03-04

## 2019-10-09 ENCOUNTER — TELEPHONE (OUTPATIENT)
Dept: CARDIOLOGY CLINIC | Age: 69
End: 2019-10-09

## 2019-10-09 DIAGNOSIS — E78.2 MIXED HYPERLIPIDEMIA: Primary | ICD-10-CM

## 2019-10-09 DIAGNOSIS — I10 ESSENTIAL HYPERTENSION: ICD-10-CM

## 2019-10-09 RX ORDER — ROSUVASTATIN CALCIUM 40 MG/1
TABLET, COATED ORAL
Qty: 30 TABLET | Refills: 3 | Status: SHIPPED | OUTPATIENT
Start: 2019-10-09 | End: 2020-02-06

## 2019-10-09 RX ORDER — SPIRONOLACTONE 25 MG/1
25 TABLET ORAL DAILY
Qty: 30 TABLET | Refills: 3 | Status: SHIPPED | OUTPATIENT
Start: 2019-10-09 | End: 2020-02-06

## 2019-10-25 DIAGNOSIS — I50.22 CHRONIC SYSTOLIC CONGESTIVE HEART FAILURE (HCC): ICD-10-CM

## 2019-10-25 DIAGNOSIS — E78.2 MIXED HYPERLIPIDEMIA: ICD-10-CM

## 2019-10-25 DIAGNOSIS — I25.5 ISCHEMIC CARDIOMYOPATHY: ICD-10-CM

## 2019-10-25 DIAGNOSIS — I10 ESSENTIAL HYPERTENSION: ICD-10-CM

## 2019-10-25 LAB
ANION GAP SERPL CALCULATED.3IONS-SCNC: 12 MMOL/L (ref 3–16)
BUN BLDV-MCNC: 19 MG/DL (ref 7–20)
CALCIUM SERPL-MCNC: 9.6 MG/DL (ref 8.3–10.6)
CHLORIDE BLD-SCNC: 101 MMOL/L (ref 99–110)
CO2: 26 MMOL/L (ref 21–32)
CREAT SERPL-MCNC: 0.7 MG/DL (ref 0.8–1.3)
GFR AFRICAN AMERICAN: >60
GFR NON-AFRICAN AMERICAN: >60
GLUCOSE BLD-MCNC: 100 MG/DL (ref 70–99)
POTASSIUM SERPL-SCNC: 4.6 MMOL/L (ref 3.5–5.1)
SODIUM BLD-SCNC: 139 MMOL/L (ref 136–145)

## 2019-10-29 ENCOUNTER — OFFICE VISIT (OUTPATIENT)
Dept: CARDIOLOGY CLINIC | Age: 69
End: 2019-10-29
Payer: COMMERCIAL

## 2019-10-29 VITALS
OXYGEN SATURATION: 99 % | DIASTOLIC BLOOD PRESSURE: 66 MMHG | HEIGHT: 73 IN | HEART RATE: 47 BPM | BODY MASS INDEX: 35.25 KG/M2 | WEIGHT: 266 LBS | SYSTOLIC BLOOD PRESSURE: 104 MMHG

## 2019-10-29 DIAGNOSIS — E78.2 MIXED HYPERLIPIDEMIA: ICD-10-CM

## 2019-10-29 DIAGNOSIS — I50.22 CHRONIC SYSTOLIC HEART FAILURE (HCC): ICD-10-CM

## 2019-10-29 DIAGNOSIS — G47.33 OSA (OBSTRUCTIVE SLEEP APNEA): ICD-10-CM

## 2019-10-29 DIAGNOSIS — I25.5 ISCHEMIC CARDIOMYOPATHY: Primary | ICD-10-CM

## 2019-10-29 DIAGNOSIS — I10 ESSENTIAL HYPERTENSION: ICD-10-CM

## 2019-10-29 PROCEDURE — 4040F PNEUMOC VAC/ADMIN/RCVD: CPT | Performed by: INTERNAL MEDICINE

## 2019-10-29 PROCEDURE — 1123F ACP DISCUSS/DSCN MKR DOCD: CPT | Performed by: INTERNAL MEDICINE

## 2019-10-29 PROCEDURE — G8417 CALC BMI ABV UP PARAM F/U: HCPCS | Performed by: INTERNAL MEDICINE

## 2019-10-29 PROCEDURE — G8484 FLU IMMUNIZE NO ADMIN: HCPCS | Performed by: INTERNAL MEDICINE

## 2019-10-29 PROCEDURE — G8427 DOCREV CUR MEDS BY ELIG CLIN: HCPCS | Performed by: INTERNAL MEDICINE

## 2019-10-29 PROCEDURE — G8598 ASA/ANTIPLAT THER USED: HCPCS | Performed by: INTERNAL MEDICINE

## 2019-10-29 PROCEDURE — 1036F TOBACCO NON-USER: CPT | Performed by: INTERNAL MEDICINE

## 2019-10-29 PROCEDURE — 99214 OFFICE O/P EST MOD 30 MIN: CPT | Performed by: INTERNAL MEDICINE

## 2019-10-29 PROCEDURE — 3017F COLORECTAL CA SCREEN DOC REV: CPT | Performed by: INTERNAL MEDICINE

## 2019-10-29 PROCEDURE — 93000 ELECTROCARDIOGRAM COMPLETE: CPT | Performed by: INTERNAL MEDICINE

## 2019-11-19 ENCOUNTER — HOSPITAL ENCOUNTER (OUTPATIENT)
Dept: NON INVASIVE DIAGNOSTICS | Age: 69
Discharge: HOME | End: 2019-11-19
Payer: COMMERCIAL

## 2019-11-19 DIAGNOSIS — I25.5 ISCHEMIC CARDIOMYOPATHY: ICD-10-CM

## 2019-11-19 DIAGNOSIS — I50.22 CHRONIC SYSTOLIC HEART FAILURE (HCC): ICD-10-CM

## 2019-11-19 LAB
LV EF: 38 %
LVEF MODALITY: NORMAL

## 2019-11-19 PROCEDURE — 6360000004 HC RX CONTRAST MEDICATION: Performed by: INTERNAL MEDICINE

## 2019-11-19 PROCEDURE — C8929 TTE W OR WO FOL WCON,DOPPLER: HCPCS

## 2019-11-19 RX ADMIN — PERFLUTREN 1.65 MG: 6.52 INJECTION, SUSPENSION INTRAVENOUS at 11:40

## 2019-11-25 DIAGNOSIS — I77.819 DILATATION OF AORTA (HCC): ICD-10-CM

## 2019-11-25 DIAGNOSIS — I77.819 AORTIC DILATATION (HCC): ICD-10-CM

## 2019-11-25 DIAGNOSIS — I50.22 CHRONIC SYSTOLIC CONGESTIVE HEART FAILURE (HCC): Primary | ICD-10-CM

## 2019-12-19 ENCOUNTER — HOSPITAL ENCOUNTER (OUTPATIENT)
Dept: NON INVASIVE DIAGNOSTICS | Age: 69
Discharge: HOME | End: 2019-12-19
Payer: COMMERCIAL

## 2019-12-19 ENCOUNTER — HOSPITAL ENCOUNTER (OUTPATIENT)
Dept: CT IMAGING | Age: 69
Discharge: HOME | End: 2019-12-19
Payer: COMMERCIAL

## 2019-12-19 DIAGNOSIS — I50.22 CHRONIC SYSTOLIC CONGESTIVE HEART FAILURE (HCC): ICD-10-CM

## 2019-12-19 DIAGNOSIS — I77.819 AORTIC DILATATION (HCC): ICD-10-CM

## 2019-12-19 LAB
LV EF: 36 %
LVEF MODALITY: NORMAL

## 2019-12-19 PROCEDURE — 3430000000 HC RX DIAGNOSTIC RADIOPHARMACEUTICAL: Performed by: INTERNAL MEDICINE

## 2019-12-19 PROCEDURE — 71250 CT THORAX DX C-: CPT

## 2019-12-19 PROCEDURE — 78472 GATED HEART PLANAR SINGLE: CPT

## 2019-12-19 RX ORDER — TECHNETIUM TC99M PYROPHOSPHATE 12 MG/10ML
25 INJECTION INTRAVENOUS ONCE
Status: COMPLETED | OUTPATIENT
Start: 2019-12-19 | End: 2019-12-19

## 2019-12-19 RX ADMIN — TECHNETIUM TC99M PYROPHOSPHATE 25 MILLICURIE: 12 INJECTION INTRAVENOUS at 08:11

## 2019-12-23 ENCOUNTER — TELEPHONE (OUTPATIENT)
Dept: CARDIOLOGY CLINIC | Age: 69
End: 2019-12-23

## 2019-12-23 DIAGNOSIS — I77.819 DILATATION OF AORTA (HCC): Primary | ICD-10-CM

## 2020-01-03 RX ORDER — RAMIPRIL 10 MG/1
CAPSULE ORAL
Qty: 90 CAPSULE | Refills: 1 | Status: SHIPPED | OUTPATIENT
Start: 2020-01-03 | End: 2020-06-29

## 2020-02-06 RX ORDER — SPIRONOLACTONE 25 MG/1
25 TABLET ORAL DAILY
Qty: 30 TABLET | Refills: 3 | Status: SHIPPED | OUTPATIENT
Start: 2020-02-06 | End: 2020-05-29

## 2020-02-06 RX ORDER — ROSUVASTATIN CALCIUM 40 MG/1
TABLET, COATED ORAL
Qty: 30 TABLET | Refills: 3 | Status: SHIPPED | OUTPATIENT
Start: 2020-02-06 | End: 2020-05-29

## 2020-03-04 RX ORDER — CARVEDILOL 25 MG/1
TABLET ORAL
Qty: 180 TABLET | Refills: 3 | Status: SHIPPED | OUTPATIENT
Start: 2020-03-04 | End: 2021-02-23

## 2020-05-29 RX ORDER — SPIRONOLACTONE 25 MG/1
25 TABLET ORAL DAILY
Qty: 30 TABLET | Refills: 0 | Status: SHIPPED | OUTPATIENT
Start: 2020-05-29 | End: 2020-06-29

## 2020-05-29 RX ORDER — ROSUVASTATIN CALCIUM 40 MG/1
TABLET, COATED ORAL
Qty: 30 TABLET | Refills: 0 | Status: SHIPPED | OUTPATIENT
Start: 2020-05-29 | End: 2020-06-29

## 2020-06-29 RX ORDER — RAMIPRIL 10 MG/1
CAPSULE ORAL
Qty: 90 CAPSULE | Refills: 1 | Status: SHIPPED | OUTPATIENT
Start: 2020-06-29 | End: 2020-12-29 | Stop reason: SDUPTHER

## 2020-06-29 RX ORDER — SPIRONOLACTONE 25 MG/1
25 TABLET ORAL DAILY
Qty: 30 TABLET | Refills: 2 | Status: SHIPPED | OUTPATIENT
Start: 2020-06-29 | End: 2020-09-28 | Stop reason: SDUPTHER

## 2020-06-29 RX ORDER — ROSUVASTATIN CALCIUM 40 MG/1
TABLET, COATED ORAL
Qty: 30 TABLET | Refills: 0 | Status: SHIPPED | OUTPATIENT
Start: 2020-06-29 | End: 2020-07-28

## 2020-07-28 RX ORDER — ROSUVASTATIN CALCIUM 40 MG/1
TABLET, COATED ORAL
Qty: 30 TABLET | Refills: 5 | Status: SHIPPED | OUTPATIENT
Start: 2020-07-28 | End: 2021-01-25

## 2020-09-28 RX ORDER — SPIRONOLACTONE 25 MG/1
25 TABLET ORAL DAILY
Qty: 30 TABLET | Refills: 5 | Status: SHIPPED | OUTPATIENT
Start: 2020-09-28 | End: 2021-03-25

## 2020-12-29 ENCOUNTER — TELEPHONE (OUTPATIENT)
Dept: CARDIOLOGY CLINIC | Age: 70
End: 2020-12-29

## 2020-12-29 RX ORDER — RAMIPRIL 10 MG/1
CAPSULE ORAL
Qty: 30 CAPSULE | Refills: 0 | Status: SHIPPED | OUTPATIENT
Start: 2020-12-29 | End: 2021-01-25 | Stop reason: SDUPTHER

## 2020-12-29 NOTE — TELEPHONE ENCOUNTER
LAST OV:  10/29/2019 - follow up in [address]:  NONE  LAST BMP:  10/25/2019     will call the pt to schedule overdue follow up

## 2020-12-29 NOTE — TELEPHONE ENCOUNTER
Please call the is pt to schedule overdue 6mos follow up  We should have seen him in April 2020    thanks

## 2020-12-29 NOTE — TELEPHONE ENCOUNTER
Pt called in to schedule an appointment. Stated he does not want to come in office until after he gets the coronavirus vaccine. Malcom Bernheim said he is on the list and will have the second shot by February hopefully.  He is scheduled to see Dr. Lexi Mata on 3/2 @ 8am

## 2021-01-25 RX ORDER — RAMIPRIL 10 MG/1
CAPSULE ORAL
Qty: 30 CAPSULE | Refills: 0 | Status: SHIPPED | OUTPATIENT
Start: 2021-01-25 | End: 2021-02-23

## 2021-01-25 RX ORDER — ROSUVASTATIN CALCIUM 40 MG/1
TABLET, COATED ORAL
Qty: 30 TABLET | Refills: 5 | Status: SHIPPED | OUTPATIENT
Start: 2021-01-25 | End: 2021-07-23

## 2021-02-23 DIAGNOSIS — I25.5 ISCHEMIC CARDIOMYOPATHY: ICD-10-CM

## 2021-02-23 RX ORDER — RAMIPRIL 10 MG/1
CAPSULE ORAL
Qty: 90 CAPSULE | Refills: 3 | Status: SHIPPED | OUTPATIENT
Start: 2021-02-23 | End: 2022-02-16

## 2021-02-23 RX ORDER — CARVEDILOL 25 MG/1
TABLET ORAL
Qty: 180 TABLET | Refills: 3 | Status: SHIPPED | OUTPATIENT
Start: 2021-02-23 | End: 2022-02-16

## 2021-03-25 RX ORDER — SPIRONOLACTONE 25 MG/1
25 TABLET ORAL DAILY
Qty: 30 TABLET | Refills: 5 | Status: SHIPPED | OUTPATIENT
Start: 2021-03-25 | End: 2021-09-21

## 2021-08-09 NOTE — PROGRESS NOTES
[facility]      Cardiology Consult    Rob Braga  1950    August 10, 2021    Reason for Referral: Ischemic cardiomyopathy    CC: \"I'm feeling fine. \"     HPI:  The patient is 66 y.o. male with a past medical history significant for ischemic cardiomyopathy, CAD s/p PCI in 2000, HTN, MIC, and HLD who presents today for management of cardiomyopathy. He presented to the hospital 6/2018 with complaints of atypical chest pain and ruled out for ACS. He previously followed with Dr. Willow Almaguer. He had an acute proximal LAD MI in 2000 with a resulting ischemic cardiomyopathy. In general, he felt he was doing quite well and had no recurrent cardiac issues. He was evaluated by Dr. Cooper Leslie for ICD consideration and at that time deferred ICD placement. His repeat echo 11/2019 showed his EF remained reduced at 35%. He completed a Muga scan that confirmed the EF at 36%. Today, he states overall he is doing well. He denies any new cardiac complaints and states he continues to remain active without any exertional symptoms. He denies any chest pains or worsening shortness of breath. He reports compliance with his medications and tolerating. He denies any abnormal bleeding or bruising. Patient denies exertional chest pain/pressure, dyspnea at rest, worsening AVERY, PND, orthopnea, palpitations, lightheadedness, weight changes, changes in LE edema, and syncope. He reports compliance with his CPAP. Past Medical History:   Diagnosis Date    Acute MI ([facility] [address] 2000    coronary angiogram revealed a normal left main, normal circumflex, the LAD complete occlusion proximally with a mid 65% stenosis that was reduced by PTCA/stent to 0%, 20% distal right coronary with an LVEF of 35%.      Basal cell carcinoma     chin    CAD (coronary artery disease)     Cardiomyopathy ([facility] [address]     Hypercholesterolemia     Hypertension     Obstructive sleep apnea 2014    Dr. Orlando Bennett     Past Surgical History:   Procedure Laterality Date    CARDIAC CATHETERIZATION      PTCA/stent in the mid LAD reducing a 65% stenosis to 0%    COLONOSCOPY  *2019 (  )    Dr. Cassandra Asencio ( [address] [place] [address] ) - diverticulosis and a tubular adenoma    CORONARY ANGIOPLASTY      stent    KNEE ARTHROSCOPY  *2017    Dr. Gema Mooney - right torn meniscus    SKIN CANCER EXCISION      chin    TOTAL HIP ARTHROPLASTY  *2018    Dr. Yessi Hale - right     Family History   Problem Relation Age of Onset    Cancer Mother [de-identified]         - breast cancer    Heart Disease Father 79         - ASHD     Social History     Tobacco Use    Smoking status: Never Smoker    Smokeless tobacco: Never Used   Vaping Use    Vaping Use: Never used   Substance Use Topics    Alcohol use: Yes     Comment: 2 kristal per night    Drug use: No       No Known Allergies  Current Outpatient Medications   Medication Sig Dispense Refill    rosuvastatin (CRESTOR) 40 MG tablet TAKE 1 TABLET BY MOUTH EVERY DAY 30 tablet 2    spironolactone (ALDACTONE) 25 MG tablet TAKE 1 TABLET BY MOUTH DAILY 30 tablet 5    ramipril (ALTACE) 10 MG capsule TAKE 1 CAPSULE BY MOUTH EVERY DAY. FOLLOW UP APPOINTMENT TO RECEIVE FUTURE REFILLS 90 capsule 3    carvedilol (COREG) 25 MG tablet TAKE 1 TABLET BY MOUTH TWICE DAILY 180 tablet 3    aspirin 81 MG tablet Take 162 mg by mouth nightly       No current facility-administered medications for this visit. Review of Systems:  · Constitutional: no unanticipated weight loss. There's been no change in energy level, sleep pattern, or activity level. No fevers, chills. · Eyes: No visual changes or diplopia. No scleral icterus. · ENT: No Headaches, hearing loss or vertigo. No mouth sores or sore throat. · Cardiovascular: as reviewed in HPI  · Respiratory: No cough or wheezing, no sputum production. No hematemesis. · Gastrointestinal: No abdominal pain, appetite loss, blood in stools. No change in bowel or bladder habits.   · Genitourinary: No dysuria, trouble voiding, or hematuria. · Musculoskeletal:  No gait disturbance, no joint complaints. · Integumentary: No rash or pruritis. · Neurological: No headache, diplopia, change in muscle strength, numbness or tingling. · Psychiatric: No anxiety or depression. · Endocrine: No temperature intolerance. No excessive thirst, fluid intake, or urination. No tremor. · Hematologic/Lymphatic: No abnormal bruising or bleeding, blood clots or swollen lymph nodes. · Allergic/Immunologic: No nasal congestion or hives. Physical Exam:   /68 (Site: Left Upper Arm, Position: Sitting)   Pulse 55   Ht 6' 1\" (1.854 m)   Wt 270 lb (122.5 kg)   SpO2 97%   BMI 35.62 kg/m²   Wt Readings from Last 3 Encounters:   08/10/21 270 lb (122.5 kg)   10/29/19 266 lb (120.7 kg)   02/12/19 267 lb (121.1 kg)     Constitutional: He is oriented to person, place, and time. He appears well-developed and well-nourished. In no acute distress. Head: Normocephalic and atraumatic. Pupils equal and round. Neck: Neck supple. No JVP or carotid bruit appreciated. No mass and no thyromegaly present. No lymphadenopathy present. Cardiovascular: Bradycardic but regular. Normal heart sounds. Exam reveals no gallop and no friction rub. No murmur heard. Pulmonary/Chest: Effort normal and breath sounds normal. No respiratory distress. He has no wheezes, rhonchi or rales. Abdominal: Soft, non-tender. Bowel sounds are normal. He exhibits no organomegaly, mass or bruit. Extremities: No edema. cyanosis, or clubbing. Pulses are 2+ radial/carotid bilaterally. Neurological: No gross cranial nerve deficit. Coordination normal.   Skin: Skin is warm and dry. There is no rash or diaphoresis. Psychiatric: He has a normal mood and affect.  His speech is normal and behavior is normal.     Lab Review:   FLP:    Lab Results   Component Value Date    TRIG 85 06/27/2018    HDL 42 08/06/2021    LDLCALC 82 08/06/2021    LABVLDL 21 08/06/2021 BUN/Creatinine:    Lab Results   Component Value Date    BUN 20 2021    CREATININE 1.0 2021 ([facility]) Cr. 0.82, BUN 13, K 4.3    EK18 NSR. Left axis. LBBB. 19 Sinus bradycardia. Left bundle branch block. 10/29/19 Sinus bradycardia. LBBB. 8/10/21 Sinus bradycardia with 1st degree AV block. LBBB. Echo: 18  Suboptimal image quality. Definity contrast administered. Ejection fraction appears significantly decreased, with apical,akinesis & severe anterior hypokinesis. Estimated LVEF is 30-35%. Diastolic filling parameters suggest grade I diastolic dysfunction. Mitral valve leaflets appear mildly thickened. Trivial mitral regurgitation is present. Trivial aortic regurgitation is present. IVC size is dilated (>2.1 cm) and collapses < 50% with respiration consistent with elevated RA pressure (15 mmHg). Cath:  Proximal LAD occlusion. Lexiscan: 18   Abnormal study. There is a large sized, severe intensity, fixed defect of    the mid anterior, mid anteroseptal, mid inferoseptal, all apical segments    and the apical cap which is consistent with scar/prior infarction.    No evidence of significant reversible ischemia.    Severely dilated left ventricle with moderately reduced ejection fraction    calculated at 37 %.    Overall findings represent a high risk study based on the sized of the fixed    defect consistent with prior LAD infarct. CT chest: 19   1. No acute intrapulmonary findings. 2. 4.4 cm fusiform ectasia of the ascending aorta. 3. Cholelithiasis. Echo: 19  There is mild concentric left ventricular hypertrophy. Ejection fraction is moderately reduced visually estimated to be 35-40%. There is akinesis of the anterior ,& septal walls. Calvin is dyskinetic  Grade I diastolic dysfunction with normal LV filling pressures  The left atrium is moderately dilated. The aortic root is mildly dilated.   The ascending aorta is mildly dilated. Moderately dilated right ventricle. Right ventricular systolic function is normal .  IVC size dilated (>2.1 cm), collapses > 50% with respiration consistent with elevated RA pressure (8 mmHg). Muga 12/19/19   Abnormal resting left ventricular function with moderate-severe global   hypokinesis and EF= 36%     Assessment and Plan:   1) Ischemic cardiomyopathy/chronic systolic heart failure. EF 35% on echo and Muga confirmed EF at 36% in 2019. Appears euvolemic/compensated. NYHA I-II. Continue with medical management and risk factor modification including aspirin, statin, ACE-I, aldactone, and B-blocker. Discussed switching ACE-I to [facility] or addition of [place] but patient continues to do well and prefers to continue current medical therapy. Instructed to call with any worsening symptoms and would consider switching his medications. 2) Dilated ascending aorta. CT measured ascending aorta at 4.4 cm. Will repeat non-contrast CT. 3) CAD. Asymptomatic. Cath in 2000 showed proximal LAD occlusion. Continue with medical management and risk factor modification including aspirin, statin, and B-blocker. 4) Essential hypertension. Controlled. Goal BP <130/80. Continue medical therapy. 5) Hyperlipidemia. Continue high intensity statin with Crestor 40 mg daily. 6) MIC. On CPAP and reports compliance. Follow up in 1 year. Thank you very much for allowing me to participate in the care of your patient. Please do not hesitate to contact me if you have any questions. Sincerely,  Nicol Martinez. Cheryl Lopes, [address] [place] [address]  Ph: [phone]  Fax: [phone]       This note was scribed in the presence of Dr Cheryl Lopes MD by Savannah Montemayor, ALAN. Physician Attestation: The scribes documentation has been prepared under my direction and personally reviewed by me in its entirety.  I confirm that the note above accurately reflects all work, treatment, procedures, and medical decision making performed by me. All portions of the note including but not limited to the chief complaint, history of present illness, physical exam, assessment and plan/medical decision making were personally reviewed, edited, and updated on the day of the visit.

## 2021-08-09 NOTE — PATIENT INSTRUCTIONS
Patient Education        DASH Diet: Care Instructions  Your Care Instructions     The DASH diet is an eating plan that can help lower your blood pressure. DASH stands for Dietary Approaches to Stop Hypertension. Hypertension is high blood pressure. The DASH diet focuses on eating foods that are high in calcium, potassium, and magnesium. These nutrients can lower blood pressure. The foods that are highest in these nutrients are fruits, vegetables, low-fat dairy products, nuts, seeds, and legumes. But taking calcium, potassium, and magnesium supplements instead of eating foods that are high in those nutrients does not have the same effect. The DASH diet also includes whole grains, fish, and poultry. The DASH diet is one of several lifestyle changes your doctor may recommend to lower your high blood pressure. Your doctor may also want you to decrease the amount of sodium in your diet. Lowering sodium while following the DASH diet can lower blood pressure even further than just the DASH diet alone. Follow-up care is a key part of your treatment and safety. Be sure to make and go to all appointments, and call your doctor if you are having problems. It's also a good idea to know your test results and keep a list of the medicines you take. How can you care for yourself at home? Following the DASH diet  · Eat 4 to 5 servings of fruit each day. A serving is 1 medium-sized piece of fruit, ½ cup chopped or canned fruit, 1/4 cup dried fruit, or 4 ounces (½ cup) of fruit juice. Choose fruit more often than fruit juice. · Eat 4 to 5 servings of vegetables each day. A serving is 1 cup of lettuce or raw leafy vegetables, ½ cup of chopped or cooked vegetables, or 4 ounces (½ cup) of vegetable juice. Choose vegetables more often than vegetable juice. · Get 2 to 3 servings of low-fat and fat-free dairy each day. A serving is 8 ounces of milk, 1 cup of yogurt, or 1 ½ ounces of cheese. · Eat 6 to 8 servings of grains each day. A serving is 1 slice of bread, 1 ounce of dry cereal, or ½ cup of cooked rice, pasta, or cooked cereal. Try to choose whole-grain products as much as possible. · Limit lean meat, poultry, and fish to 2 servings each day. A serving is 3 ounces, about the size of a deck of cards. · Eat 4 to 5 servings of nuts, seeds, and legumes (cooked dried beans, lentils, and split peas) each week. A serving is 1/3 cup of nuts, 2 tablespoons of seeds, or ½ cup of cooked beans or peas. · Limit fats and oils to 2 to 3 servings each day. A serving is 1 teaspoon of vegetable oil or 2 tablespoons of salad dressing. · Limit sweets and added sugars to 5 servings or less a week. A serving is 1 tablespoon jelly or jam, ½ cup sorbet, or 1 cup of lemonade. · Eat less than 2,300 milligrams (mg) of sodium a day. If you limit your sodium to 1,500 mg a day, you can lower your blood pressure even more. · Be aware that all of these are the suggested number of servings for people who eat 1,800 to 2,000 calories a day. Your recommended number of servings may be different if you need more or fewer calories. Tips for success  · Start small. Do not try to make dramatic changes to your diet all at once. You might feel that you are missing out on your favorite foods and then be more likely to not follow the plan. Make small changes, and stick with them. Once those changes become habit, add a few more changes. · Try some of the following:  ? Make it a goal to eat a fruit or vegetable at every meal and at snacks. This will make it easy to get the recommended amount of fruits and vegetables each day. ? Try yogurt topped with fruit and nuts for a snack or healthy dessert. ? Add lettuce, tomato, cucumber, and onion to sandwiches. ? Combine a ready-made pizza crust with low-fat mozzarella cheese and lots of vegetable toppings. Try using tomatoes, squash, spinach, broccoli, carrots, cauliflower, and onions. ?  Have a variety of cut-up vegetables with a low-fat dip as an appetizer instead of chips and dip. ? Sprinkle sunflower seeds or chopped almonds over salads. Or try adding chopped walnuts or almonds to cooked vegetables. ? Try some vegetarian meals using beans and peas. Add garbanzo or kidney beans to salads. Make burritos and tacos with mashed vázquez beans or black beans. Where can you learn more? Go to https://[employer].LinkedIn. org and sign in to your [employer] account. Enter R063 in the [employer] box to learn more about \"DASH Diet: Care Instructions. \"     If you do not have an account, please click on the \"Sign Up Now\" link. Current as of: August 31, 2020               Content Version: 12.9  © 7146-8927 [employer]. Care instructions adapted under license by [facility] ([facility]). If you have questions about a medical condition or this instruction, always ask your healthcare professional. [address]  any warranty or liability for your use of this information.

## 2021-08-10 ENCOUNTER — OFFICE VISIT (OUTPATIENT)
Dept: CARDIOLOGY CLINIC | Age: 71
End: 2021-08-10
Payer: COMMERCIAL

## 2021-08-10 VITALS
BODY MASS INDEX: 35.78 KG/M2 | HEART RATE: 55 BPM | OXYGEN SATURATION: 97 % | DIASTOLIC BLOOD PRESSURE: 68 MMHG | WEIGHT: 270 LBS | SYSTOLIC BLOOD PRESSURE: 110 MMHG | HEIGHT: 73 IN

## 2021-08-10 DIAGNOSIS — I25.5 ISCHEMIC CARDIOMYOPATHY: Primary | ICD-10-CM

## 2021-08-10 DIAGNOSIS — E78.2 MIXED HYPERLIPIDEMIA: ICD-10-CM

## 2021-08-10 DIAGNOSIS — I10 ESSENTIAL HYPERTENSION: ICD-10-CM

## 2021-08-10 DIAGNOSIS — I77.810 ASCENDING AORTA DILATATION (HCC): ICD-10-CM

## 2021-08-10 DIAGNOSIS — G47.33 OSA (OBSTRUCTIVE SLEEP APNEA): ICD-10-CM

## 2021-08-10 DIAGNOSIS — I25.10 CORONARY ARTERY DISEASE INVOLVING NATIVE CORONARY ARTERY OF NATIVE HEART WITHOUT ANGINA PECTORIS: ICD-10-CM

## 2021-08-10 PROCEDURE — 3017F COLORECTAL CA SCREEN DOC REV: CPT | Performed by: INTERNAL MEDICINE

## 2021-08-10 PROCEDURE — 1036F TOBACCO NON-USER: CPT | Performed by: INTERNAL MEDICINE

## 2021-08-10 PROCEDURE — 93000 ELECTROCARDIOGRAM COMPLETE: CPT | Performed by: INTERNAL MEDICINE

## 2021-08-10 PROCEDURE — G8417 CALC BMI ABV UP PARAM F/U: HCPCS | Performed by: INTERNAL MEDICINE

## 2021-08-10 PROCEDURE — 4040F PNEUMOC VAC/ADMIN/RCVD: CPT | Performed by: INTERNAL MEDICINE

## 2021-08-10 PROCEDURE — G8427 DOCREV CUR MEDS BY ELIG CLIN: HCPCS | Performed by: INTERNAL MEDICINE

## 2021-08-10 PROCEDURE — 1123F ACP DISCUSS/DSCN MKR DOCD: CPT | Performed by: INTERNAL MEDICINE

## 2021-08-10 PROCEDURE — 99214 OFFICE O/P EST MOD 30 MIN: CPT | Performed by: INTERNAL MEDICINE

## 2021-08-16 ENCOUNTER — HOSPITAL ENCOUNTER (OUTPATIENT)
Dept: CT IMAGING | Age: 71
Discharge: HOME | End: 2021-08-16
Payer: COMMERCIAL

## 2021-08-16 DIAGNOSIS — I77.810 ASCENDING AORTA DILATATION (HCC): ICD-10-CM

## 2021-08-16 PROCEDURE — 71250 CT THORAX DX C-: CPT

## 2021-09-21 RX ORDER — SPIRONOLACTONE 25 MG/1
25 TABLET ORAL DAILY
Qty: 90 TABLET | Refills: 2 | Status: SHIPPED | OUTPATIENT
Start: 2021-09-21 | End: 2022-05-24

## 2021-09-21 NOTE — TELEPHONE ENCOUNTER
Last ov 8/10/21  Pending appt yearly, 2022 schedule not out yet  Last refill 3/25/21 #30x5  Last labs 8/6/21  Last ekg 8/10/21

## 2021-10-21 RX ORDER — ROSUVASTATIN CALCIUM 40 MG/1
TABLET, COATED ORAL
Qty: 90 TABLET | Refills: 3 | Status: SHIPPED | OUTPATIENT
Start: 2021-10-21 | End: 2022-10-14

## 2021-10-21 NOTE — TELEPHONE ENCOUNTER
Last OV: 8/10/21  Next OV: 1 yr f/u 8/2022  Last refill:7/23/21  Most recent Labs: 8/6/21  Last EKG (if needed):8/10/21

## 2022-02-16 DIAGNOSIS — I25.5 ISCHEMIC CARDIOMYOPATHY: ICD-10-CM

## 2022-02-16 RX ORDER — RAMIPRIL 10 MG/1
CAPSULE ORAL
Qty: 90 CAPSULE | Refills: 3 | Status: SHIPPED | OUTPATIENT
Start: 2022-02-16

## 2022-02-16 RX ORDER — CARVEDILOL 25 MG/1
TABLET ORAL
Qty: 180 TABLET | Refills: 3 | Status: SHIPPED | OUTPATIENT
Start: 2022-02-16

## 2022-05-24 RX ORDER — SPIRONOLACTONE 25 MG/1
25 TABLET ORAL DAILY
Qty: 90 TABLET | Refills: 3 | Status: SHIPPED | OUTPATIENT
Start: 2022-05-24

## 2022-10-14 DIAGNOSIS — E78.2 MIXED HYPERLIPIDEMIA: Primary | ICD-10-CM

## 2022-10-14 DIAGNOSIS — E78.00 HYPERCHOLESTEROLEMIA: ICD-10-CM

## 2022-10-14 RX ORDER — ROSUVASTATIN CALCIUM 40 MG/1
TABLET, COATED ORAL
Qty: 30 TABLET | Refills: 0 | Status: SHIPPED | OUTPATIENT
Start: 2022-10-14

## 2022-10-14 NOTE — TELEPHONE ENCOUNTER
Last OV: 08/10/2021  Next OV: 10/28/2022  Most recent Labs: no recent       Labs have been placed. Called pt to let him know and he states he has a upcoming appointment on 10/28 and will like to get labs drawn then.

## 2022-11-16 DIAGNOSIS — E78.00 HYPERCHOLESTEROLEMIA: ICD-10-CM

## 2022-11-16 DIAGNOSIS — E78.2 MIXED HYPERLIPIDEMIA: ICD-10-CM

## 2022-11-16 LAB
CHOLESTEROL, FASTING: 136 MG/DL (ref 0–199)
HDLC SERPL-MCNC: 41 MG/DL (ref 40–60)
LDL CHOLESTEROL CALCULATED: 76 MG/DL
TRIGLYCERIDE, FASTING: 96 MG/DL (ref 0–150)
VLDLC SERPL CALC-MCNC: 19 MG/DL

## 2022-11-16 RX ORDER — ROSUVASTATIN CALCIUM 40 MG/1
TABLET, COATED ORAL
Qty: 30 TABLET | Refills: 3 | Status: SHIPPED | OUTPATIENT
Start: 2022-11-16

## 2022-11-16 NOTE — TELEPHONE ENCOUNTER
Last O/V:  08/10/2021  Next O/V:  2022  Last Refill: 10/14/2022  Last Labs:  Lipid  2022  Last EK/10/2021

## 2022-11-18 ENCOUNTER — OFFICE VISIT (OUTPATIENT)
Dept: CARDIOLOGY CLINIC | Age: 72
End: 2022-11-18
Payer: MEDICARE

## 2022-11-18 VITALS
HEIGHT: 73 IN | SYSTOLIC BLOOD PRESSURE: 136 MMHG | DIASTOLIC BLOOD PRESSURE: 68 MMHG | OXYGEN SATURATION: 98 % | BODY MASS INDEX: 36.31 KG/M2 | WEIGHT: 274 LBS | HEART RATE: 51 BPM

## 2022-11-18 DIAGNOSIS — I25.5 ISCHEMIC CARDIOMYOPATHY: Primary | ICD-10-CM

## 2022-11-18 DIAGNOSIS — I25.10 CORONARY ARTERY DISEASE INVOLVING NATIVE CORONARY ARTERY OF NATIVE HEART WITHOUT ANGINA PECTORIS: ICD-10-CM

## 2022-11-18 DIAGNOSIS — E78.2 MIXED HYPERLIPIDEMIA: ICD-10-CM

## 2022-11-18 DIAGNOSIS — I50.22 CHRONIC SYSTOLIC HEART FAILURE (HCC): ICD-10-CM

## 2022-11-18 DIAGNOSIS — G47.33 OSA (OBSTRUCTIVE SLEEP APNEA): ICD-10-CM

## 2022-11-18 DIAGNOSIS — I77.810 ASCENDING AORTA DILATATION (HCC): ICD-10-CM

## 2022-11-18 DIAGNOSIS — I10 ESSENTIAL HYPERTENSION: ICD-10-CM

## 2022-11-18 PROCEDURE — 99214 OFFICE O/P EST MOD 30 MIN: CPT | Performed by: INTERNAL MEDICINE

## 2022-11-18 PROCEDURE — 1036F TOBACCO NON-USER: CPT | Performed by: INTERNAL MEDICINE

## 2022-11-18 PROCEDURE — G8417 CALC BMI ABV UP PARAM F/U: HCPCS | Performed by: INTERNAL MEDICINE

## 2022-11-18 PROCEDURE — 93000 ELECTROCARDIOGRAM COMPLETE: CPT | Performed by: INTERNAL MEDICINE

## 2022-11-18 PROCEDURE — 3074F SYST BP LT 130 MM HG: CPT | Performed by: INTERNAL MEDICINE

## 2022-11-18 PROCEDURE — 3078F DIAST BP <80 MM HG: CPT | Performed by: INTERNAL MEDICINE

## 2022-11-18 PROCEDURE — 1123F ACP DISCUSS/DSCN MKR DOCD: CPT | Performed by: INTERNAL MEDICINE

## 2022-11-18 PROCEDURE — G8427 DOCREV CUR MEDS BY ELIG CLIN: HCPCS | Performed by: INTERNAL MEDICINE

## 2022-11-18 PROCEDURE — 3017F COLORECTAL CA SCREEN DOC REV: CPT | Performed by: INTERNAL MEDICINE

## 2022-11-18 PROCEDURE — G8484 FLU IMMUNIZE NO ADMIN: HCPCS | Performed by: INTERNAL MEDICINE

## 2022-11-18 NOTE — PROGRESS NOTES
[address]      Cardiology Consult    S[place]  1950 November 18, 2022    Reason for Referral: Ischemic cardiomyopathy    CC: \"Sometimes I feel tired\"    HPI:  The patient is 74 y.o. male with a past medical history significant for ischemic cardiomyopathy, CAD s/p PCI in 2000, HTN, MIC, and HLD who presents today for management of cardiomyopathy. He presented to the hospital 6/2018 with complaints of atypical chest pain and ruled out for ACS. He previously followed with Dr. Sarmad Landeros. He had an acute proximal LAD MI in 2000 with a resulting ischemic cardiomyopathy. In general, he felt he was doing quite well and had no recurrent cardiac issues. He was evaluated by Dr. Jose Juan Valentino for ICD consideration and at that time deferred ICD placement. His repeat echo 11/2019 showed his EF remained reduced at 35%. He completed a Muga scan that confirmed the EF at 36%. Today, he states overall he is doing well. He complains of generalized fatigue at times without any particular trigger but requests a referral to sleep medicine as his CPAP machine is > 21years old. He denies any new cardiac complaints. He denies chest pains with his level of activity. He reports compliance with his medications and tolerating. He denies any abnormal bleeding or bruising. Patient denies exertional chest pain/pressure, dyspnea at rest, worsening AVERY, PND, orthopnea, palpitations, lightheadedness, weight changes, changes in LE edema, and syncope. Past Medical History:   Diagnosis Date    Acute MI ([facility] [address] 2000    coronary angiogram revealed a normal left main, normal circumflex, the LAD complete occlusion proximally with a mid 65% stenosis that was reduced by PTCA/stent to 0%, 20% distal right coronary with an LVEF of 35%.      Basal cell carcinoma     chin    CAD (coronary artery disease)     Cardiomyopathy ([facility] [address]     Hypercholesterolemia     Hypertension     Obstructive sleep apnea 2014    Dr. Moshe Tompkins     Past Surgical History:   Procedure Laterality Date    CARDIAC CATHETERIZATION      PTCA/stent in the mid LAD reducing a 65% stenosis to 0%    COLONOSCOPY  *2019 (  )    Dr. Troy Foster ( [address] [place] [address] ) - diverticulosis and a tubular adenoma    CORONARY ANGIOPLASTY      stent    KNEE ARTHROSCOPY  *2017    Dr. Kwesi Burr - right torn meniscus    SKIN CANCER EXCISION      chin    TOTAL HIP ARTHROPLASTY  *2018    Dr. Philip Durham - right     Family History   Problem Relation Age of Onset    Cancer Mother [de-identified]         - breast cancer    Heart Disease Father 79         - ASHD     Social History     Tobacco Use    Smoking status: Never    Smokeless tobacco: Never   Vaping Use    Vaping Use: Never used   Substance Use Topics    Alcohol use: Yes     Comment: 2 kristal per night    Drug use: No       No Known Allergies  Current Outpatient Medications   Medication Sig Dispense Refill    Multiple Vitamin (MULTIVITAMIN ADULT PO) Take by mouth      Omega-3 Fatty Acids (FISH OIL OMEGA-3 PO) Take by mouth      co-enzyme Q-10 30 MG capsule Take by mouth      rosuvastatin (CRESTOR) 40 MG tablet TAKE 1 TABLET BY MOUTH EVERY DAY 30 tablet 3    spironolactone (ALDACTONE) 25 MG tablet TAKE 1 TABLET BY MOUTH DAILY 90 tablet 3    ramipril (ALTACE) 10 MG capsule TAKE 1 CAPSULE BY MOUTH EVERY DAY. FOLLOW UP APPOINTMENT TO RECEIVE FUTURE REFILLS 90 capsule 3    carvedilol (COREG) 25 MG tablet TAKE 1 TABLET BY MOUTH TWICE DAILY 180 tablet 3    aspirin 81 MG tablet Take 162 mg by mouth nightly       No current facility-administered medications for this visit. Review of Systems:  Constitutional: no unanticipated weight loss. There's been no change in energy level, sleep pattern, or activity level. No fevers, chills. Eyes: No visual changes or diplopia. No scleral icterus. ENT: No Headaches, hearing loss or vertigo. No mouth sores or sore throat.   Cardiovascular: as reviewed in HPI  Respiratory: No cough or wheezing, no sputum production. No hematemesis. Gastrointestinal: No abdominal pain, appetite loss, blood in stools. No change in bowel or bladder habits. Genitourinary: No dysuria, trouble voiding, or hematuria. Musculoskeletal:  No gait disturbance, no joint complaints. Integumentary: No rash or pruritis. Neurological: No headache, diplopia, change in muscle strength, numbness or tingling. Psychiatric: No anxiety or depression. Endocrine: No temperature intolerance. No excessive thirst, fluid intake, or urination. No tremor. Hematologic/Lymphatic: No abnormal bruising or bleeding, blood clots or swollen lymph nodes. Allergic/Immunologic: No nasal congestion or hives. Physical Exam:   /68 (Site: Left Upper Arm, Position: Sitting, Cuff Size: Large Adult)   Pulse 51   Ht 6' 1\" (1.854 m)   Wt 274 lb (124.3 kg)   SpO2 98%   BMI 36.15 kg/m²   Wt Readings from Last 3 Encounters:   11/18/22 274 lb (124.3 kg)   08/10/21 270 lb (122.5 kg)   10/29/19 266 lb (120.7 kg)     Constitutional: He is oriented to person, place, and time. He appears well-developed and well-nourished. In no acute distress. Head: Normocephalic and atraumatic. Pupils equal and round. Neck: Neck supple. No JVP or carotid bruit appreciated. No mass and no thyromegaly present. No lymphadenopathy present. Cardiovascular: Bradycardic but regular. Normal heart sounds. Exam reveals no gallop and no friction rub. No murmur heard. Pulmonary/Chest: Effort normal and breath sounds normal. No respiratory distress. He has no wheezes, rhonchi or rales. Abdominal: Soft, non-tender. Bowel sounds are normal. He exhibits no organomegaly, mass or bruit. Extremities: Trace edema. cyanosis, or clubbing. Pulses are 2+ radial/carotid bilaterally. Neurological: No gross cranial nerve deficit. Coordination normal.   Skin: Skin is warm and dry. There is no rash or diaphoresis. Psychiatric: He has a normal mood and affect.  His speech is normal and behavior is normal.     Lab Review:   FLP:    Lab Results   Component Value Date/Time    TRIG 85 2018 05:34 AM    HDL 41 2022 07:47 AM    LDLCALC 76 2022 07:47 AM    LABVLDL 19 2022 07:47 AM     BUN/Creatinine:    Lab Results   Component Value Date/Time    BUN 20 2021 08:51 AM    CREATININE 1.0 2021 08:51 AM   2018 ([facility] Cr. 0.82, BUN 13, K 4.3    EK18 NSR. Left axis. LBBB. 19 Sinus bradycardia. Left bundle branch block. 10/29/19 Sinus bradycardia. LBBB. 8/10/21 Sinus bradycardia with 1st degree AV block. LBBB. 22 Sinus bradycardia. Left bundle branch block. Echo: 18  Suboptimal image quality. Definity contrast administered. Ejection fraction appears significantly decreased, with apical,akinesis & severe anterior hypokinesis. Estimated LVEF is 30-35%. Diastolic filling parameters suggest grade I diastolic dysfunction. Mitral valve leaflets appear mildly thickened. Trivial mitral regurgitation is present. Trivial aortic regurgitation is present. IVC size is dilated (>2.1 cm) and collapses < 50% with respiration consistent with elevated RA pressure (15 mmHg). Cath:  Proximal LAD occlusion. Lexiscan: 18   Abnormal study. There is a large sized, severe intensity, fixed defect of    the mid anterior, mid anteroseptal, mid inferoseptal, all apical segments    and the apical cap which is consistent with scar/prior infarction. No evidence of significant reversible ischemia. Severely dilated left ventricle with moderately reduced ejection fraction    calculated at 37 %. Overall findings represent a high risk study based on the sized of the fixed    defect consistent with prior LAD infarct. CT chest: 19   1. No acute intrapulmonary findings. 2. 4.4 cm fusiform ectasia of the ascending aorta. 3. Cholelithiasis. Echo: 19  There is mild concentric left ventricular hypertrophy.   Ejection fraction is moderately reduced visually estimated to be 35-40%. There is akinesis of the anterior ,& septal walls. Vesta is dyskinetic  Grade I diastolic dysfunction with normal LV filling pressures  The left atrium is moderately dilated. The aortic root is mildly dilated. The ascending aorta is mildly dilated. Moderately dilated right ventricle. Right ventricular systolic function is normal .  IVC size dilated (>2.1 cm), collapses > 50% with respiration consistent with elevated RA pressure (8 mmHg). Muga 12/19/19   Abnormal resting left ventricular function with moderate-severe global   hypokinesis and EF= 36%      CT chest 8/10/21  Caliber of ascending aorta measures 4.2 cm  Fatty metaplasia left ventricle, with coronary artery calcification  Cholelithiasis    Assessment and Plan:   1) Ischemic cardiomyopathy/chronic systolic heart failure. EF 35% on echo and Muga confirmed EF at 36% in 2019. Appears euvolemic/compensated. NYHA I-II. Continue with medical management and risk factor modification including aspirin, statin, ACE-I, aldactone, and B-blocker. Discussed the addition of SGLT2i and patient is agreeable. Will start Farxiga 10 mg daily. Pt instructed to call if cost prohibitive and can try Jardiance. Discussed switching ramipril to [facility] if has worsenieng symptoms. Will repeat the echocardiogram with the last echo being is 2019. Will check updated lab work. 2) Dilated ascending aorta. Repeat CT measured ascending aorta at 4.2 cm (8/10/21). 3) CAD. Asymptomatic. Cath in 2000 showed proximal LAD occlusion. Continue with medical management and risk factor modification including aspirin, statin, and B-blocker. 4) Essential hypertension. Controlled. Goal BP <130/80. Continue medical therapy. 5) Hyperlipidemia. LDL 76 (11/16/22). Continue high intensity statin with Crestor 40 mg daily. 6) MIC. On CPAP and reports compliance. Will refer to sleep medicine per patient preference. Follow up in 1 year. Thank you very much for allowing me to participate in the care of your patient. Please do not hesitate to contact me if you have any questions. Sincerely,  Nadege Benoit. Jeremiah Caruso MD      [place] [address]  Ph: [phone]  Fax: [phone]     This note was scribed in the presence of Dr Jeremiah Caruso MD by Martine Tay RN. Physician Attestation: The scribes documentation has been prepared under my direction and personally reviewed by me in its entirety. I confirm that the note above accurately reflects all work, treatment, procedures, and medical decision making performed by me. All portions of the note including but not limited to the chief complaint, history of present illness, physical exam, assessment and plan/medical decision making were personally reviewed, edited, and updated on the day of the visit.

## 2022-12-19 ENCOUNTER — TELEPHONE (OUTPATIENT)
Dept: CARDIOLOGY CLINIC | Age: 72
End: 2022-12-19

## 2022-12-19 NOTE — TELEPHONE ENCOUNTER
Ole Stoddard called in today he said he is covid positive and does not have a pcp and wants to know what Dr. Marli Man recommends him to do to get better.     Ole Stoddard can be reached at [phone]

## 2022-12-19 NOTE — TELEPHONE ENCOUNTER
Called and spoke with [facility], he states he tested positive on Saturday and seems to feel like he just has a \"head cold. \" He denies any worsening shortness of breath, chest pains, or lightheadedness. Discussed support measures, staying hydrated and when to proceed to the emergency room.

## 2023-02-08 DIAGNOSIS — I25.5 ISCHEMIC CARDIOMYOPATHY: ICD-10-CM

## 2023-02-08 RX ORDER — RAMIPRIL 10 MG/1
CAPSULE ORAL
Qty: 90 CAPSULE | Refills: 3 | Status: SHIPPED | OUTPATIENT
Start: 2023-02-08

## 2023-02-08 RX ORDER — CARVEDILOL 25 MG/1
TABLET ORAL
Qty: 180 TABLET | Refills: 3 | Status: SHIPPED | OUTPATIENT
Start: 2023-02-08

## 2023-02-08 NOTE — TELEPHONE ENCOUNTER
Medication:   Requested Prescriptions     Pending Prescriptions Disp Refills    ramipril (ALTACE) 10 MG capsule [Pharmacy Med Name: RAMIPRIL 10MG CAPSULES] 90 capsule 3     Sig: TAKE 1 CAPSULE BY MOUTH EVERY DAY.  FOLLOW UP APPOINTMENT TO RECEIVE FUTURE REFILLS    carvedilol (COREG) 25 MG tablet [Pharmacy Med Name: CARVEDILOL 25MG TABLETS] 180 tablet 3     Sig: TAKE 1 TABLET BY MOUTH TWICE DAILY     Last Filled:  2/16/22    Last appt: 11/18/2022   Next appt: Visit date not found    Last Lipid:   Lab Results   Component Value Date/Time    CHOL 125 06/27/2018 05:34 AM    TRIG 85 06/27/2018 05:34 AM    HDL 41 11/16/2022 07:47 AM    LDLCALC 76 11/16/2022 07:47 AM

## 2023-03-06 RX ORDER — ROSUVASTATIN CALCIUM 40 MG/1
TABLET, COATED ORAL
Qty: 30 TABLET | Refills: 3 | Status: SHIPPED | OUTPATIENT
Start: 2023-03-06

## 2023-03-06 NOTE — TELEPHONE ENCOUNTER
LOV 11/18/22 NOV 11/27/23  Last filled # 30 with 3 refills on 11/16/22 11/16/22 0747 8/6/21 0851 6/27/18 0534 8/14/17 0953 6/2/16 0855 4/9/15 1350 3/3/14 1017     Cholesterol, Fasting 0 - 199 mg/dL 136  145         Triglyceride, Fasting 0 - 150 mg/dL 96  105         HDL 40 - 60 mg/dL 41  42  51  48  48 Low  R, CM  38 Low   40    LDL Calculated <100 mg/dL 76  82  57  73  87 R  80  77    VLDL Cholesterol Calculated Not Established mg/dL 19  21  17  21   18  25

## 2023-03-20 ENCOUNTER — TELEPHONE (OUTPATIENT)
Dept: FAMILY MEDICINE CLINIC | Age: 73
End: 2023-03-20

## 2023-03-20 ENCOUNTER — OFFICE VISIT (OUTPATIENT)
Dept: SLEEP MEDICINE | Age: 73
End: 2023-03-20
Payer: MEDICARE

## 2023-03-20 VITALS
RESPIRATION RATE: 18 BRPM | TEMPERATURE: 97 F | HEART RATE: 53 BPM | OXYGEN SATURATION: 100 % | BODY MASS INDEX: 36.32 KG/M2 | WEIGHT: 283 LBS | DIASTOLIC BLOOD PRESSURE: 81 MMHG | HEIGHT: 74 IN | SYSTOLIC BLOOD PRESSURE: 153 MMHG

## 2023-03-20 DIAGNOSIS — I10 PRIMARY HYPERTENSION: ICD-10-CM

## 2023-03-20 DIAGNOSIS — Z86.79 H/O CHF: ICD-10-CM

## 2023-03-20 DIAGNOSIS — I25.5 ISCHEMIC CARDIOMYOPATHY: ICD-10-CM

## 2023-03-20 DIAGNOSIS — G47.33 OSA ON CPAP: Primary | ICD-10-CM

## 2023-03-20 DIAGNOSIS — Z99.89 OSA ON CPAP: Primary | ICD-10-CM

## 2023-03-20 DIAGNOSIS — E66.01 CLASS 2 SEVERE OBESITY DUE TO EXCESS CALORIES WITH SERIOUS COMORBIDITY AND BODY MASS INDEX (BMI) OF 36.0 TO 36.9 IN ADULT (HCC): ICD-10-CM

## 2023-03-20 DIAGNOSIS — Z99.89 DEPENDENCE ON OTHER ENABLING MACHINES AND DEVICES: ICD-10-CM

## 2023-03-20 LAB
DEPRECATED RDW RBC AUTO: 14.3 % (ref 12.4–15.4)
HCT VFR BLD AUTO: 44.4 % (ref 40.5–52.5)
HGB BLD-MCNC: 14.3 G/DL (ref 13.5–17.5)
MCH RBC QN AUTO: 31.6 PG (ref 26–34)
MCHC RBC AUTO-ENTMCNC: 32.2 G/DL (ref 31–36)
MCV RBC AUTO: 98.3 FL (ref 80–100)
PLATELET # BLD AUTO: 167 K/UL (ref 135–450)
PMV BLD AUTO: 10.3 FL (ref 5–10.5)
RBC # BLD AUTO: 4.51 M/UL (ref 4.2–5.9)
WBC # BLD AUTO: 5.6 K/UL (ref 4–11)

## 2023-03-20 PROCEDURE — 3077F SYST BP >= 140 MM HG: CPT | Performed by: PSYCHIATRY & NEUROLOGY

## 2023-03-20 PROCEDURE — 1123F ACP DISCUSS/DSCN MKR DOCD: CPT | Performed by: PSYCHIATRY & NEUROLOGY

## 2023-03-20 PROCEDURE — G8484 FLU IMMUNIZE NO ADMIN: HCPCS | Performed by: PSYCHIATRY & NEUROLOGY

## 2023-03-20 PROCEDURE — 3079F DIAST BP 80-89 MM HG: CPT | Performed by: PSYCHIATRY & NEUROLOGY

## 2023-03-20 PROCEDURE — G8427 DOCREV CUR MEDS BY ELIG CLIN: HCPCS | Performed by: PSYCHIATRY & NEUROLOGY

## 2023-03-20 PROCEDURE — 99204 OFFICE O/P NEW MOD 45 MIN: CPT | Performed by: PSYCHIATRY & NEUROLOGY

## 2023-03-20 PROCEDURE — 1036F TOBACCO NON-USER: CPT | Performed by: PSYCHIATRY & NEUROLOGY

## 2023-03-20 PROCEDURE — G8417 CALC BMI ABV UP PARAM F/U: HCPCS | Performed by: PSYCHIATRY & NEUROLOGY

## 2023-03-20 PROCEDURE — 3017F COLORECTAL CA SCREEN DOC REV: CPT | Performed by: PSYCHIATRY & NEUROLOGY

## 2023-03-20 ASSESSMENT — SLEEP AND FATIGUE QUESTIONNAIRES
HOW LIKELY ARE YOU TO NOD OFF OR FALL ASLEEP WHILE SITTING QUIETLY AFTER LUNCH WITHOUT ALCOHOL: 0
NECK CIRCUMFERENCE (INCHES): 19
HOW LIKELY ARE YOU TO NOD OFF OR FALL ASLEEP WHILE SITTING AND READING: 1
HOW LIKELY ARE YOU TO NOD OFF OR FALL ASLEEP IN A CAR, WHILE STOPPED FOR A FEW MINUTES IN TRAFFIC: 0
HOW LIKELY ARE YOU TO NOD OFF OR FALL ASLEEP WHILE SITTING INACTIVE IN A PUBLIC PLACE: 0
ESS TOTAL SCORE: 3
HOW LIKELY ARE YOU TO NOD OFF OR FALL ASLEEP WHILE SITTING AND TALKING TO SOMEONE: 0
HOW LIKELY ARE YOU TO NOD OFF OR FALL ASLEEP WHILE LYING DOWN TO REST IN THE AFTERNOON WHEN CIRCUMSTANCES PERMIT: 1
HOW LIKELY ARE YOU TO NOD OFF OR FALL ASLEEP WHEN YOU ARE A PASSENGER IN A CAR FOR AN HOUR WITHOUT A BREAK: 0
HOW LIKELY ARE YOU TO NOD OFF OR FALL ASLEEP WHILE WATCHING TV: 1

## 2023-03-20 ASSESSMENT — ENCOUNTER SYMPTOMS
RESPIRATORY NEGATIVE: 1
ALLERGIC/IMMUNOLOGIC NEGATIVE: 1
EYES NEGATIVE: 1

## 2023-03-20 NOTE — PROGRESS NOTES
presence and severity. Patient was counseled to avoid driving and other potentially hazardous circumstances if the patient is experiencing excessive sleepiness. Treatment considerations include the use of nasal CPAP,  In the meantime, the patient should be cautioned to avoid the use of alcohol or other depressant medications because of potential for increasing the duration and severity of apnea and cautioned regarding driving or operating and dangerous equipment if the patient is experiencing daytime sleepiness. .  Most likely treating the MIC will have positive impact on HTN, and CHF control. The proportionality between weight and AHI. With 10% weight change, the AHI has a 27% proportionate change. With 20% weight change, the AHI has a 45-50% proportionate change. Orders Placed This Encounter   Procedures    Sleep Study with PAP Titration         Return for F/U between 31 and 90 days from the recieving CPAP.     Chanell Abdi MD  Medical Director - [facility]

## 2023-03-20 NOTE — TELEPHONE ENCOUNTER
Pt stopped in office wanting to know if you will accept him for a New Patient ,  He sees Dr Belen Leo, and wants to try to keep his medical care with [facility] , He was seeing someone with [facility]

## 2023-03-20 NOTE — PATIENT INSTRUCTIONS
Orders Placed This Encounter   Procedures    Sleep Study with PAP Titration     Standing Status:   Future     Standing Expiration Date:   3/20/2024     Order Specific Question:   Sleep Study Titration Type     Answer:   Split Night Study (Baseline followed by PAP Titration)     Order Specific Question:   Location For Sleep Study     Answer:   [facility]     Order Specific Question:   Select Sleep Lab Location     Answer:   [facility]

## 2023-03-21 LAB
ALBUMIN SERPL-MCNC: 4.3 G/DL (ref 3.4–5)
ALBUMIN/GLOB SERPL: 2 {RATIO} (ref 1.1–2.2)
ALP SERPL-CCNC: 89 U/L (ref 40–129)
ALT SERPL-CCNC: 14 U/L (ref 10–40)
ANION GAP SERPL CALCULATED.3IONS-SCNC: 15 MMOL/L (ref 3–16)
AST SERPL-CCNC: 18 U/L (ref 15–37)
BILIRUB SERPL-MCNC: 0.4 MG/DL (ref 0–1)
BUN SERPL-MCNC: 21 MG/DL (ref 7–20)
CALCIUM SERPL-MCNC: 9.6 MG/DL (ref 8.3–10.6)
CHLORIDE SERPL-SCNC: 104 MMOL/L (ref 99–110)
CO2 SERPL-SCNC: 23 MMOL/L (ref 21–32)
CREAT SERPL-MCNC: 1 MG/DL (ref 0.8–1.3)
GFR SERPLBLD CREATININE-BSD FMLA CKD-EPI: >60 ML/MIN/{1.73_M2}
GLUCOSE SERPL-MCNC: 133 MG/DL (ref 70–99)
POTASSIUM SERPL-SCNC: 4.4 MMOL/L (ref 3.5–5.1)
PROT SERPL-MCNC: 6.5 G/DL (ref 6.4–8.2)
SODIUM SERPL-SCNC: 142 MMOL/L (ref 136–145)

## 2023-04-17 NOTE — PROGRESS NOTES
necessary in reference to accepted standards of medicalpractice in treatment of this patients condition.     Francisca Shepherd MD      NPI: [id number]       Order Signed Date: 04/17/23    Electronically signed by Francisca Shepherd MD on 4/17/2023 at 8:29 AM

## 2023-04-18 ENCOUNTER — TELEPHONE (OUTPATIENT)
Dept: PULMONOLOGY | Age: 73
End: 2023-04-18

## 2023-04-18 NOTE — TELEPHONE ENCOUNTER
Left message or patient to call re; Sleep study (Split Night Study) showed severe MIC. AHI was 79.2  per hr. And O2 Desaturations to 85%. Dr Recommends CPAP at pressure of 15 cm  Patient will need to choose a DME.

## 2023-04-20 NOTE — TELEPHONE ENCOUNTER
[place]o calls in to tell us that he would like [address] as his DME. Please send his order there. [employer] is his primary insurance, Medicare is secondary, and [employer] is 3rd. Thank you!

## 2023-05-30 RX ORDER — SPIRONOLACTONE 25 MG/1
25 TABLET ORAL DAILY
Qty: 90 TABLET | Refills: 3 | Status: SHIPPED | OUTPATIENT
Start: 2023-05-30

## 2023-05-30 NOTE — TELEPHONE ENCOUNTER
Last OV: 11/18/22  Next OV: 11/27/23  Last refill: 5/2/2023  Most recent Labs: CMP CBC , 03/20/23  Last EKG (if needed): 11/18/22

## 2023-06-27 RX ORDER — ROSUVASTATIN CALCIUM 40 MG/1
TABLET, COATED ORAL
Qty: 90 TABLET | Refills: 1 | Status: SHIPPED | OUTPATIENT
Start: 2023-06-27

## 2023-11-13 DIAGNOSIS — I50.22 CHRONIC SYSTOLIC HEART FAILURE (HCC): ICD-10-CM

## 2023-11-13 DIAGNOSIS — I25.5 ISCHEMIC CARDIOMYOPATHY: Primary | ICD-10-CM

## 2024-01-09 NOTE — PROGRESS NOTES
[facility]      Cardiology Consult    Jerome Rajan  1950    January 10, 2024    Reason for Referral: Ischemic cardiomyopathy    CC: \"I had a great year\"     HPI:  The patient is 73 y.o. male with a past medical history significant for ischemic cardiomyopathy, CAD s/p PCI in 2000, HTN, MIC, and HLD who presents today for management of cardiomyopathy. He presented to the hospital 6/2018 with complaints of atypical chest pain and ruled out for ACS. He previously followed with Dr. Lockwood. He had an acute proximal LAD MI in 2000 with a resulting ischemic cardiomyopathy. In general, he felt he was doing quite well and had no recurrent cardiac issues. He was evaluated by Dr. Louie for ICD consideration and at that time deferred ICD placement. His repeat echo 11/2019 showed his EF remained reduced at 35%. He completed a Muga scan that confirmed the EF at 36%. His repeat echocardiogram 12/21/23 revealed a reduced EF of 25-30% but image quality was suboptimal. Today he presents for follow up and states that overall he is feeling well. He has been compliant with Entresto and states he does not necessarily feel any \"different.\" He denies any new sounding cardiac complaints. He denies any chest pains or worsening shortness of breath. He reports medication compliance. He does report \"dry mouth\" and wonders if this is related to Farxiga. He does not feel particularly limited and states he walked 4 miles the other day. He denies any abnormal bleeding or bruising. He denies exertional chest pain/pressure, dyspnea at rest, worsening AVERY, PND, orthopnea, palpitations, lightheadedness, weight changes, changes in LE edema, and syncope.      Past Medical History:   Diagnosis Date    Acute MI ([facility]) 2000    coronary angiogram revealed a normal left main, normal circumflex, the LAD complete occlusion proximally with a mid 65% stenosis that was reduced by PTCA/stent to 0%, 20% distal right coronary with an

## 2024-01-10 ENCOUNTER — OFFICE VISIT (OUTPATIENT)
Dept: CARDIOLOGY CLINIC | Age: 74
End: 2024-01-10

## 2024-01-10 VITALS
BODY MASS INDEX: 35.25 KG/M2 | HEART RATE: 64 BPM | OXYGEN SATURATION: 99 % | HEIGHT: 73 IN | WEIGHT: 266 LBS | SYSTOLIC BLOOD PRESSURE: 126 MMHG | DIASTOLIC BLOOD PRESSURE: 70 MMHG

## 2024-01-10 DIAGNOSIS — E78.5 HYPERLIPIDEMIA LDL GOAL <70: ICD-10-CM

## 2024-01-10 DIAGNOSIS — I50.22 CHRONIC SYSTOLIC HEART FAILURE (HCC): ICD-10-CM

## 2024-01-10 DIAGNOSIS — I25.5 ISCHEMIC CARDIOMYOPATHY: Primary | ICD-10-CM

## 2024-01-10 DIAGNOSIS — I25.10 CORONARY ARTERY DISEASE INVOLVING NATIVE CORONARY ARTERY OF NATIVE HEART WITHOUT ANGINA PECTORIS: ICD-10-CM

## 2024-01-10 DIAGNOSIS — I10 ESSENTIAL HYPERTENSION: ICD-10-CM

## 2024-01-10 DIAGNOSIS — I77.810 ASCENDING AORTA DILATATION (HCC): ICD-10-CM

## 2024-01-10 DIAGNOSIS — I25.5 ISCHEMIC CARDIOMYOPATHY: ICD-10-CM

## 2024-01-10 LAB
ALBUMIN SERPL-MCNC: 4.7 G/DL (ref 3.4–5)
ALBUMIN/GLOB SERPL: 2.4 {RATIO} (ref 1.1–2.2)
ALP SERPL-CCNC: 95 U/L (ref 40–129)
ALT SERPL-CCNC: 15 U/L (ref 10–40)
ANION GAP SERPL CALCULATED.3IONS-SCNC: 10 MMOL/L (ref 3–16)
AST SERPL-CCNC: 18 U/L (ref 15–37)
BILIRUB SERPL-MCNC: 0.7 MG/DL (ref 0–1)
BUN SERPL-MCNC: 20 MG/DL (ref 7–20)
CALCIUM SERPL-MCNC: 10 MG/DL (ref 8.3–10.6)
CHLORIDE SERPL-SCNC: 103 MMOL/L (ref 99–110)
CHOLEST SERPL-MCNC: 149 MG/DL (ref 0–199)
CO2 SERPL-SCNC: 28 MMOL/L (ref 21–32)
CREAT SERPL-MCNC: 1.1 MG/DL (ref 0.8–1.3)
DEPRECATED RDW RBC AUTO: 13.7 % (ref 12.4–15.4)
GFR SERPLBLD CREATININE-BSD FMLA CKD-EPI: >60 ML/MIN/{1.73_M2}
GLUCOSE SERPL-MCNC: 103 MG/DL (ref 70–99)
HCT VFR BLD AUTO: 48.7 % (ref 40.5–52.5)
HDLC SERPL-MCNC: 44 MG/DL (ref 40–60)
HGB BLD-MCNC: 16 G/DL (ref 13.5–17.5)
LDL CHOLESTEROL CALCULATED: 82 MG/DL
MCH RBC QN AUTO: 32.4 PG (ref 26–34)
MCHC RBC AUTO-ENTMCNC: 32.8 G/DL (ref 31–36)
MCV RBC AUTO: 99 FL (ref 80–100)
PLATELET # BLD AUTO: 215 K/UL (ref 135–450)
PMV BLD AUTO: 9.8 FL (ref 5–10.5)
POTASSIUM SERPL-SCNC: 5 MMOL/L (ref 3.5–5.1)
PROT SERPL-MCNC: 6.7 G/DL (ref 6.4–8.2)
RBC # BLD AUTO: 4.92 M/UL (ref 4.2–5.9)
SODIUM SERPL-SCNC: 141 MMOL/L (ref 136–145)
TRIGL SERPL-MCNC: 115 MG/DL (ref 0–150)
TSH SERPL DL<=0.005 MIU/L-ACNC: 1.23 UIU/ML (ref 0.27–4.2)
VLDLC SERPL CALC-MCNC: 23 MG/DL
WBC # BLD AUTO: 6.9 K/UL (ref 4–11)

## 2024-01-17 DIAGNOSIS — I25.5 ISCHEMIC CARDIOMYOPATHY: ICD-10-CM

## 2024-01-17 RX ORDER — CARVEDILOL 25 MG/1
TABLET ORAL
Qty: 180 TABLET | Refills: 3 | Status: SHIPPED | OUTPATIENT
Start: 2024-01-17

## 2024-03-18 RX ORDER — ROSUVASTATIN CALCIUM 40 MG/1
TABLET, COATED ORAL
Qty: 90 TABLET | Refills: 3 | Status: SHIPPED | OUTPATIENT
Start: 2024-03-18

## 2024-03-18 RX ORDER — DAPAGLIFLOZIN 10 MG/1
10 TABLET, FILM COATED ORAL EVERY MORNING
Qty: 90 TABLET | Refills: 3 | Status: SHIPPED | OUTPATIENT
Start: 2024-03-18

## 2024-03-18 NOTE — TELEPHONE ENCOUNTER
Last OV:1/10/2024  Last Labs:1/10/2024  Last Refills:2024   Next Appt:7/10/2024     Last EK/10/2024

## 2024-04-23 RX ORDER — RAMIPRIL 10 MG/1
CAPSULE ORAL
Qty: 90 CAPSULE | Refills: 3 | OUTPATIENT
Start: 2024-04-23

## 2024-04-23 NOTE — TELEPHONE ENCOUNTER
Last OV:   Next OV:   Last refill:  Most recent Labs:   Last EKG (if needed):    Called patient to confirm.  Patient said that ramipril has been discontinued.  He said that he accidentally reordered on [employer]'s website.  Ok to refuse.

## 2024-04-24 RX ORDER — SPIRONOLACTONE 25 MG/1
25 TABLET ORAL DAILY
Qty: 90 TABLET | Refills: 0 | Status: SHIPPED | OUTPATIENT
Start: 2024-04-24

## 2024-06-06 RX ORDER — SPIRONOLACTONE 25 MG/1
25 TABLET ORAL DAILY
Qty: 30 TABLET | Refills: 3 | Status: SHIPPED | OUTPATIENT
Start: 2024-06-06

## 2024-06-06 NOTE — TELEPHONE ENCOUNTER
Last O/V:  1/10/24   Next O/V:  7/10/24   Last Refill: 24   Last Labs:  CMP TSH w. reflex:  1/10/24   Last EK/10/24

## 2024-09-17 ENCOUNTER — HOSPITAL ENCOUNTER (OUTPATIENT)
Dept: MRI IMAGING | Age: 74
Discharge: HOME | End: 2024-09-17
Attending: INTERNAL MEDICINE
Payer: MEDICARE

## 2024-09-17 DIAGNOSIS — I25.5 ISCHEMIC CARDIOMYOPATHY: ICD-10-CM

## 2024-09-17 DIAGNOSIS — I50.22 CHRONIC SYSTOLIC HEART FAILURE (HCC): ICD-10-CM

## 2024-09-17 DIAGNOSIS — I77.810 ASCENDING AORTA DILATATION (HCC): ICD-10-CM

## 2024-09-17 PROCEDURE — A9585 GADOBUTROL INJECTION: HCPCS | Performed by: INTERNAL MEDICINE

## 2024-09-17 PROCEDURE — 6360000004 HC RX CONTRAST MEDICATION: Performed by: INTERNAL MEDICINE

## 2024-09-17 PROCEDURE — 75565 CARD MRI VELOC FLOW MAPPING: CPT

## 2024-09-17 RX ORDER — GADOBUTROL 604.72 MG/ML
20 INJECTION INTRAVENOUS
Status: COMPLETED | OUTPATIENT
Start: 2024-09-17 | End: 2024-09-17

## 2024-09-17 RX ADMIN — GADOBUTROL 20 ML: 604.72 INJECTION INTRAVENOUS at 16:39

## 2024-09-18 ENCOUNTER — TELEPHONE (OUTPATIENT)
Dept: CARDIOLOGY CLINIC | Age: 74
End: 2024-09-18

## 2024-09-25 ENCOUNTER — OFFICE VISIT (OUTPATIENT)
Dept: CARDIOLOGY CLINIC | Age: 74
End: 2024-09-25
Payer: COMMERCIAL

## 2024-09-25 VITALS
DIASTOLIC BLOOD PRESSURE: 60 MMHG | BODY MASS INDEX: 34.99 KG/M2 | OXYGEN SATURATION: 100 % | SYSTOLIC BLOOD PRESSURE: 116 MMHG | HEIGHT: 73 IN | HEART RATE: 63 BPM | WEIGHT: 264 LBS

## 2024-09-25 DIAGNOSIS — I50.42 CHRONIC COMBINED SYSTOLIC AND DIASTOLIC CHF (CONGESTIVE HEART FAILURE) (HCC): ICD-10-CM

## 2024-09-25 DIAGNOSIS — I25.10 CORONARY ARTERY DISEASE INVOLVING NATIVE CORONARY ARTERY OF NATIVE HEART WITHOUT ANGINA PECTORIS: ICD-10-CM

## 2024-09-25 DIAGNOSIS — I25.5 ISCHEMIC CARDIOMYOPATHY: Primary | ICD-10-CM

## 2024-09-25 DIAGNOSIS — I10 ESSENTIAL HYPERTENSION: ICD-10-CM

## 2024-09-25 DIAGNOSIS — I77.810 ASCENDING AORTA DILATATION (HCC): ICD-10-CM

## 2024-09-25 PROCEDURE — 3078F DIAST BP <80 MM HG: CPT | Performed by: INTERNAL MEDICINE

## 2024-09-25 PROCEDURE — 1036F TOBACCO NON-USER: CPT | Performed by: INTERNAL MEDICINE

## 2024-09-25 PROCEDURE — 99214 OFFICE O/P EST MOD 30 MIN: CPT | Performed by: INTERNAL MEDICINE

## 2024-09-25 PROCEDURE — 3074F SYST BP LT 130 MM HG: CPT | Performed by: INTERNAL MEDICINE

## 2024-09-25 PROCEDURE — G8417 CALC BMI ABV UP PARAM F/U: HCPCS | Performed by: INTERNAL MEDICINE

## 2024-09-25 PROCEDURE — 1123F ACP DISCUSS/DSCN MKR DOCD: CPT | Performed by: INTERNAL MEDICINE

## 2024-09-25 PROCEDURE — G8427 DOCREV CUR MEDS BY ELIG CLIN: HCPCS | Performed by: INTERNAL MEDICINE

## 2024-10-03 ENCOUNTER — OFFICE VISIT (OUTPATIENT)
Dept: CARDIOLOGY CLINIC | Age: 74
End: 2024-10-03

## 2024-10-03 VITALS
DIASTOLIC BLOOD PRESSURE: 70 MMHG | WEIGHT: 263 LBS | HEART RATE: 48 BPM | HEIGHT: 73 IN | SYSTOLIC BLOOD PRESSURE: 122 MMHG | BODY MASS INDEX: 34.85 KG/M2 | OXYGEN SATURATION: 98 %

## 2024-10-03 DIAGNOSIS — I77.810 ASCENDING AORTA DILATATION (HCC): Primary | ICD-10-CM

## 2024-10-23 DIAGNOSIS — I25.5 ISCHEMIC CARDIOMYOPATHY: ICD-10-CM

## 2024-10-23 RX ORDER — SACUBITRIL AND VALSARTAN 97; 103 MG/1; MG/1
1 TABLET, FILM COATED ORAL 2 TIMES DAILY
Qty: 180 TABLET | Refills: 3 | Status: SHIPPED | OUTPATIENT
Start: 2024-10-23

## 2024-10-23 RX ORDER — CARVEDILOL 25 MG/1
TABLET ORAL
Qty: 180 TABLET | Refills: 3 | Status: SHIPPED | OUTPATIENT
Start: 2024-10-23

## 2024-10-23 NOTE — TELEPHONE ENCOUNTER
Last OV: 10/3/24  Next OV: 3/26/25  Last refill: 1/10/24, 1/17/24  Most recent Labs: 1/10/24  Last EKG (if needed): 10/3/24

## 2024-12-30 RX ORDER — DAPAGLIFLOZIN 10 MG/1
10 TABLET, FILM COATED ORAL EVERY MORNING
Qty: 90 TABLET | Refills: 3 | Status: SHIPPED | OUTPATIENT
Start: 2024-12-30

## 2024-12-30 NOTE — TELEPHONE ENCOUNTER
Last OV: 10/3/24  Next OV: 3/26/25  Last refill: 3/18/24 #90 3 R/F  Most recent Labs: 1/10/24  Last EKG (if needed): 10/3/24

## 2025-01-03 RX ORDER — ROSUVASTATIN CALCIUM 40 MG/1
TABLET, COATED ORAL
Qty: 90 TABLET | Refills: 3 | Status: SHIPPED | OUTPATIENT
Start: 2025-01-03

## 2025-01-20 DIAGNOSIS — I25.10 CORONARY ARTERY DISEASE INVOLVING NATIVE CORONARY ARTERY OF NATIVE HEART WITHOUT ANGINA PECTORIS: ICD-10-CM

## 2025-01-20 DIAGNOSIS — I50.22 CHRONIC SYSTOLIC HEART FAILURE (HCC): ICD-10-CM

## 2025-01-20 DIAGNOSIS — I10 ESSENTIAL HYPERTENSION: Primary | ICD-10-CM

## 2025-01-20 RX ORDER — SPIRONOLACTONE 25 MG/1
25 TABLET ORAL DAILY
Qty: 30 TABLET | Refills: 1 | Status: SHIPPED | OUTPATIENT
Start: 2025-01-20

## 2025-01-20 NOTE — TELEPHONE ENCOUNTER
Last OV: 10/3/24  Next OV: 3/26/25  Last refill:6/6/24 #30 3 R/F  Most recent Labs:   Last EKG (if needed): 10/3/24    Called patient left message needs to get labs done. Order placed.

## 2025-02-21 RX ORDER — SPIRONOLACTONE 25 MG/1
25 TABLET ORAL DAILY
Qty: 30 TABLET | Refills: 0 | Status: SHIPPED | OUTPATIENT
Start: 2025-02-21

## 2025-02-21 NOTE — TELEPHONE ENCOUNTER
Last OV: 10/3/24  Next OV: 3/26/25  Last refill: 1/20/25 #30 1 R/F  Most recent Labs:   Last EKG (if needed): 10/3/24    Called patient left message to get his lab done before any additional refills.

## 2025-03-05 DIAGNOSIS — I50.22 CHRONIC SYSTOLIC HEART FAILURE (HCC): ICD-10-CM

## 2025-03-05 DIAGNOSIS — I10 ESSENTIAL HYPERTENSION: ICD-10-CM

## 2025-03-05 DIAGNOSIS — I25.10 CORONARY ARTERY DISEASE INVOLVING NATIVE CORONARY ARTERY OF NATIVE HEART WITHOUT ANGINA PECTORIS: ICD-10-CM

## 2025-03-05 LAB
ANION GAP SERPL CALCULATED.3IONS-SCNC: 13 MMOL/L (ref 3–16)
BUN SERPL-MCNC: 26 MG/DL (ref 7–20)
CALCIUM SERPL-MCNC: 9.9 MG/DL (ref 8.3–10.6)
CHLORIDE SERPL-SCNC: 103 MMOL/L (ref 99–110)
CO2 SERPL-SCNC: 24 MMOL/L (ref 21–32)
CREAT SERPL-MCNC: 1.2 MG/DL (ref 0.8–1.3)
GFR SERPLBLD CREATININE-BSD FMLA CKD-EPI: 62 ML/MIN/{1.73_M2}
GLUCOSE SERPL-MCNC: 84 MG/DL (ref 70–99)
POTASSIUM SERPL-SCNC: 5.7 MMOL/L (ref 3.5–5.1)
SODIUM SERPL-SCNC: 140 MMOL/L (ref 136–145)

## 2025-03-06 DIAGNOSIS — I50.22 CHRONIC SYSTOLIC HEART FAILURE (HCC): Primary | ICD-10-CM

## 2025-04-14 ENCOUNTER — TELEPHONE (OUTPATIENT)
Dept: CARDIOLOGY CLINIC | Age: 75
End: 2025-04-14

## 2025-04-14 DIAGNOSIS — R73.9 ELEVATED RANDOM BLOOD GLUCOSE LEVEL: ICD-10-CM

## 2025-04-14 DIAGNOSIS — E78.5 HYPERLIPIDEMIA LDL GOAL <70: ICD-10-CM

## 2025-04-14 DIAGNOSIS — I50.22 CHRONIC SYSTOLIC HEART FAILURE (HCC): Primary | ICD-10-CM

## 2025-04-14 NOTE — PROGRESS NOTES
[facility]      Cardiology Consult    Jerome Rajan  1950 April 16, 2025    Reason for Referral: Ischemic cardiomyopathy    CC: \"I have numbness in my feet\"     HPI:  The patient is 74 y.o. male with a past medical history significant for ischemic cardiomyopathy, CAD s/p PCI in 2000, HTN, MIC, LBBB, and HLD who presents today for management of cardiomyopathy. He presented to the hospital 6/2018 with complaints of atypical chest pain and ruled out for ACS. He previously followed with Dr. Lockwood. He had an acute proximal LAD MI in 2000 with a resulting ischemic cardiomyopathy. In general, he felt he was doing quite well and had no recurrent cardiac issues. He was evaluated by Dr. Louie for ICD consideration and at that time deferred ICD placement. His repeat echo 11/2019 showed his EF remained reduced at 35%. His repeat echocardiogram 12/21/23 revealed a reduced EF of 25-30% but image quality was suboptimal. Recent cardiac MRI showed LV function significantly reduced with EF at 33%. He saw Dr. Chao in office on 10/3/24 who recommended implantation of CRT-D. Patient wished to further think on this, but has not yet called the office with a decision. Today he presents for follow up and has multiple questions/concerns. He reports numbness/tingling of his feet/toes that began a few months ago. He feels that he becomes \"dehydrated more easily\" than he has in the past basing this on the color of his urine. He reports occasional lightheaded episodes that typically occur with positional changes after leaning over. He denies any falls or syncopal episodes. He reports occasional breast \"tenderness\" that is intermittent but denies enlargement in breast tissue. He feels that these symptoms may be a side effect of his medications. He denies any chest pains or worsening shortness of breath. He reports medication compliance. He feels his office visit with Dr. Chao \"did not go well\" and

## 2025-04-14 NOTE — TELEPHONE ENCOUNTER
Patient's potassium was elevated on last BMP and he was advised to repeat  lab work in 1 week - this has not been completed. I re-ordered a CMP, fasting lipid profile and hemoglobin A1c - please call patient and request he complete lab work. He can do morning of his OV if this is most convenient for him. Thanks.

## 2025-04-15 DIAGNOSIS — I50.22 CHRONIC SYSTOLIC HEART FAILURE (HCC): ICD-10-CM

## 2025-04-15 DIAGNOSIS — R73.9 ELEVATED RANDOM BLOOD GLUCOSE LEVEL: ICD-10-CM

## 2025-04-15 DIAGNOSIS — E78.5 HYPERLIPIDEMIA LDL GOAL <70: ICD-10-CM

## 2025-04-15 LAB
ALBUMIN SERPL-MCNC: 4.3 G/DL (ref 3.4–5)
ALBUMIN/GLOB SERPL: 2.2 {RATIO} (ref 1.1–2.2)
ALP SERPL-CCNC: 95 U/L (ref 40–129)
ALT SERPL-CCNC: 12 U/L (ref 10–40)
ANION GAP SERPL CALCULATED.3IONS-SCNC: 10 MMOL/L (ref 3–16)
AST SERPL-CCNC: 17 U/L (ref 15–37)
BILIRUB SERPL-MCNC: 0.5 MG/DL (ref 0–1)
BUN SERPL-MCNC: 27 MG/DL (ref 7–20)
CALCIUM SERPL-MCNC: 9.4 MG/DL (ref 8.3–10.6)
CHLORIDE SERPL-SCNC: 105 MMOL/L (ref 99–110)
CHOLEST SERPL-MCNC: 117 MG/DL (ref 0–199)
CO2 SERPL-SCNC: 24 MMOL/L (ref 21–32)
CREAT SERPL-MCNC: 1.1 MG/DL (ref 0.8–1.3)
EST. AVERAGE GLUCOSE BLD GHB EST-MCNC: 111.2 MG/DL
GFR SERPLBLD CREATININE-BSD FMLA CKD-EPI: 69 ML/MIN/{1.73_M2}
GLUCOSE SERPL-MCNC: 99 MG/DL (ref 70–99)
HBA1C MFR BLD: 5.5 %
HDLC SERPL-MCNC: 33 MG/DL (ref 40–60)
LDL CHOLESTEROL: 64 MG/DL
POTASSIUM SERPL-SCNC: 4.9 MMOL/L (ref 3.5–5.1)
PROT SERPL-MCNC: 6.3 G/DL (ref 6.4–8.2)
SODIUM SERPL-SCNC: 139 MMOL/L (ref 136–145)
TRIGL SERPL-MCNC: 99 MG/DL (ref 0–150)
VLDLC SERPL CALC-MCNC: 20 MG/DL

## 2025-04-16 ENCOUNTER — OFFICE VISIT (OUTPATIENT)
Dept: CARDIOLOGY CLINIC | Age: 75
End: 2025-04-16
Payer: COMMERCIAL

## 2025-04-16 ENCOUNTER — RESULTS FOLLOW-UP (OUTPATIENT)
Dept: CARDIOLOGY CLINIC | Age: 75
End: 2025-04-16

## 2025-04-16 VITALS
WEIGHT: 267 LBS | DIASTOLIC BLOOD PRESSURE: 62 MMHG | SYSTOLIC BLOOD PRESSURE: 114 MMHG | BODY MASS INDEX: 35.39 KG/M2 | OXYGEN SATURATION: 99 % | HEART RATE: 51 BPM | HEIGHT: 73 IN

## 2025-04-16 DIAGNOSIS — I25.5 ISCHEMIC CARDIOMYOPATHY: Primary | ICD-10-CM

## 2025-04-16 DIAGNOSIS — I25.10 CORONARY ARTERY DISEASE INVOLVING NATIVE CORONARY ARTERY OF NATIVE HEART WITHOUT ANGINA PECTORIS: ICD-10-CM

## 2025-04-16 DIAGNOSIS — I77.810 ASCENDING AORTA DILATATION: ICD-10-CM

## 2025-04-16 DIAGNOSIS — I50.42 CHRONIC COMBINED SYSTOLIC AND DIASTOLIC CHF (CONGESTIVE HEART FAILURE) (HCC): ICD-10-CM

## 2025-04-16 DIAGNOSIS — R20.0 NUMBNESS OF BOTH LOWER EXTREMITIES: ICD-10-CM

## 2025-04-16 PROCEDURE — G8427 DOCREV CUR MEDS BY ELIG CLIN: HCPCS | Performed by: INTERNAL MEDICINE

## 2025-04-16 PROCEDURE — 1159F MED LIST DOCD IN RCRD: CPT | Performed by: INTERNAL MEDICINE

## 2025-04-16 PROCEDURE — G8417 CALC BMI ABV UP PARAM F/U: HCPCS | Performed by: INTERNAL MEDICINE

## 2025-04-16 PROCEDURE — 99214 OFFICE O/P EST MOD 30 MIN: CPT | Performed by: INTERNAL MEDICINE

## 2025-04-16 PROCEDURE — 3074F SYST BP LT 130 MM HG: CPT | Performed by: INTERNAL MEDICINE

## 2025-04-16 PROCEDURE — 3078F DIAST BP <80 MM HG: CPT | Performed by: INTERNAL MEDICINE

## 2025-04-16 PROCEDURE — 1036F TOBACCO NON-USER: CPT | Performed by: INTERNAL MEDICINE

## 2025-04-16 PROCEDURE — 1123F ACP DISCUSS/DSCN MKR DOCD: CPT | Performed by: INTERNAL MEDICINE

## 2025-05-07 RX ORDER — SPIRONOLACTONE 25 MG/1
25 TABLET ORAL DAILY
Qty: 90 TABLET | Refills: 3 | Status: SHIPPED | OUTPATIENT
Start: 2025-05-07

## 2025-05-07 NOTE — TELEPHONE ENCOUNTER
Requested Prescriptions     Pending Prescriptions Disp Refills    spironolactone (ALDACTONE) 25 MG tablet [Pharmacy Med Name: SPIRONOLACTONE 25MG TABLETS] 30 tablet 0     Sig: TAKE 1 TABLET BY MOUTH DAILY        Last OV: 4/16/25  Next OV: 10/29/25  Last refill:2/21/25  Most recent Labs: CMP 4/15/25 and BMP 3/5/25  Last EKG (if needed):10/3/24

## 2025-05-08 ENCOUNTER — HOSPITAL ENCOUNTER (OUTPATIENT)
Dept: VASCULAR LAB | Age: 75
Discharge: HOME | End: 2025-05-10
Attending: INTERNAL MEDICINE
Payer: MEDICARE

## 2025-05-08 DIAGNOSIS — R20.0 NUMBNESS OF BOTH LOWER EXTREMITIES: ICD-10-CM

## 2025-05-08 LAB
VAS LEFT ABI: 1.2
VAS LEFT ANKLE BP: 147 MMHG
VAS LEFT ARM BP: 120 MMHG
VAS LEFT DORSALIS PEDIS BP: 140 MMHG
VAS LEFT PTA BP: 147 MMHG
VAS LEFT TBI: 0.94
VAS LEFT TOE PRESSURE: 116 MMHG
VAS RIGHT ABI: 1.24
VAS RIGHT ANKLE BP: 152 MMHG
VAS RIGHT ARM BP: 123 MMHG
VAS RIGHT DORSALIS PEDIS BP: 130 MMHG
VAS RIGHT PTA BP: 152 MMHG
VAS RIGHT TBI: 0.97
VAS RIGHT TOE PRESSURE: 119 MMHG

## 2025-05-08 PROCEDURE — 93922 UPR/L XTREMITY ART 2 LEVELS: CPT

## 2025-05-13 ENCOUNTER — RESULTS FOLLOW-UP (OUTPATIENT)
Dept: CARDIOLOGY CLINIC | Age: 75
End: 2025-05-13
